# Patient Record
Sex: FEMALE | Race: WHITE | NOT HISPANIC OR LATINO | URBAN - METROPOLITAN AREA
[De-identification: names, ages, dates, MRNs, and addresses within clinical notes are randomized per-mention and may not be internally consistent; named-entity substitution may affect disease eponyms.]

---

## 2023-06-14 LAB
ANION GAP IN SER/PLAS: 14 MMOL/L (ref 10–20)
CALCIUM (MG/DL) IN SER/PLAS: 9.8 MG/DL (ref 8.6–10.6)
CARBON DIOXIDE, TOTAL (MMOL/L) IN SER/PLAS: 25 MMOL/L (ref 21–32)
CHLORIDE (MMOL/L) IN SER/PLAS: 104 MMOL/L (ref 98–107)
CREATININE (MG/DL) IN SER/PLAS: 0.58 MG/DL (ref 0.5–1.05)
GFR FEMALE: >90 ML/MIN/1.73M2
GLUCOSE (MG/DL) IN SER/PLAS: 98 MG/DL (ref 74–99)
PHOSPHATE (MG/DL) IN SER/PLAS: 4.2 MG/DL (ref 2.5–4.9)
POC CALCIUM IONIZED (MMOL/L) IN BLOOD: 1.22 MMOL/L (ref 1.1–1.33)
POTASSIUM (MMOL/L) IN SER/PLAS: 4.1 MMOL/L (ref 3.5–5.3)
SODIUM (MMOL/L) IN SER/PLAS: 139 MMOL/L (ref 136–145)
UREA NITROGEN (MG/DL) IN SER/PLAS: 19 MG/DL (ref 6–23)

## 2023-06-15 LAB
ANION GAP IN SER/PLAS: NORMAL
CALCIUM (MG/DL) IN SER/PLAS: NORMAL
CARBON DIOXIDE, TOTAL (MMOL/L) IN SER/PLAS: NORMAL
CHLORIDE (MMOL/L) IN SER/PLAS: NORMAL
CREATININE (MG/DL) IN SER/PLAS: NORMAL
GFR FEMALE: NORMAL
GFR MALE: NORMAL
GLUCOSE (MG/DL) IN SER/PLAS: NORMAL
PHOSPHATE (MG/DL) IN SER/PLAS: NORMAL
POC CALCIUM IONIZED (MMOL/L) IN BLOOD: NORMAL
POTASSIUM (MMOL/L) IN SER/PLAS: NORMAL
SODIUM (MMOL/L) IN SER/PLAS: NORMAL
UREA NITROGEN (MG/DL) IN SER/PLAS: NORMAL

## 2023-12-14 ENCOUNTER — TELEMEDICINE (OUTPATIENT)
Dept: ENDOCRINOLOGY | Facility: CLINIC | Age: 63
End: 2023-12-14
Payer: COMMERCIAL

## 2023-12-14 DIAGNOSIS — E06.3 HYPOTHYROIDISM DUE TO HASHIMOTO'S THYROIDITIS: Primary | ICD-10-CM

## 2023-12-14 DIAGNOSIS — E03.8 HYPOTHYROIDISM DUE TO HASHIMOTO'S THYROIDITIS: Primary | ICD-10-CM

## 2023-12-14 DIAGNOSIS — E83.51 HYPOCALCEMIA: ICD-10-CM

## 2023-12-14 DIAGNOSIS — E89.2 POSTSURGICAL HYPOPARATHYROIDISM (MULTI): ICD-10-CM

## 2023-12-14 DIAGNOSIS — M32.9 LUPUS (MULTI): ICD-10-CM

## 2023-12-14 DIAGNOSIS — G90.A POTS (POSTURAL ORTHOSTATIC TACHYCARDIA SYNDROME): ICD-10-CM

## 2023-12-14 PROCEDURE — 99214 OFFICE O/P EST MOD 30 MIN: CPT | Performed by: INTERNAL MEDICINE

## 2023-12-14 RX ORDER — LIOTHYRONINE SODIUM 5 UG/1
7.5 TABLET ORAL DAILY
Qty: 90 TABLET | Refills: 3 | Status: SHIPPED | OUTPATIENT
Start: 2023-12-14

## 2023-12-14 RX ORDER — LIOTHYRONINE SODIUM 5 UG/1
7.5 TABLET ORAL DAILY
COMMUNITY
End: 2023-12-14 | Stop reason: SDUPTHER

## 2023-12-14 RX ORDER — CALCITRIOL 0.5 UG/1
1 CAPSULE ORAL EVERY 12 HOURS
COMMUNITY
Start: 2013-01-15 | End: 2023-12-14 | Stop reason: SDUPTHER

## 2023-12-14 RX ORDER — MOMETASONE FUROATE 50 UG/1
2 SPRAY, METERED NASAL DAILY
COMMUNITY
Start: 2023-02-06 | End: 2023-12-14

## 2023-12-14 RX ORDER — AMLODIPINE BESYLATE 5 MG/1
5 TABLET ORAL DAILY
COMMUNITY

## 2023-12-14 RX ORDER — CALCITRIOL 0.5 UG/1
0.5 CAPSULE ORAL EVERY 12 HOURS
Qty: 30 CAPSULE | Refills: 11 | Status: SHIPPED | OUTPATIENT
Start: 2023-12-14

## 2023-12-14 RX ORDER — CALCIUM CARB/VITAMIN D3/VIT K1 500-100-40
TABLET,CHEWABLE ORAL
COMMUNITY
Start: 2023-08-21 | End: 2024-01-22 | Stop reason: SDUPTHER

## 2023-12-14 RX ORDER — BECLOMETHASONE DIPROPIONATE HFA 40 UG/1
120 AEROSOL, METERED RESPIRATORY (INHALATION)
COMMUNITY

## 2023-12-14 RX ORDER — TERIPARATIDE 250 UG/ML
INJECTION, SOLUTION SUBCUTANEOUS
COMMUNITY
End: 2024-01-09 | Stop reason: SDUPTHER

## 2023-12-14 RX ORDER — LEVOTHYROXINE SODIUM 100 UG/1
100 TABLET ORAL
COMMUNITY
End: 2023-12-14 | Stop reason: SDUPTHER

## 2023-12-14 RX ORDER — LEVOTHYROXINE SODIUM 100 UG/1
100 TABLET ORAL
Qty: 90 TABLET | Refills: 3 | Status: SHIPPED | OUTPATIENT
Start: 2023-12-14

## 2023-12-14 NOTE — PROGRESS NOTES
Consults    Reason For Consult  Hypocalcemia     History Of Present Illness  Thomas López is a 63 y.o. female presenting with hypocalcemia .     Having autoimmune flares up  She is needed prednisone   CBD immune modulator  Diet   When stops forteo , she ha slow calcium  When takes calcium then she is high        cytomel 1.5 tb every other day , and 1 tb rest of the week   Synthorid 100 mcg six days a week     she had joint pain  wt is normal currently       currently on forteo 0.5 units daily - monthly   for her hypocalcemia and bone health    very complicated, hypocalcemia due to parathyroidectomy, and despite having PTH implant, requiring calcium monitoring weekly or biweekly .    also hypothyroidism since age 23, Hashimoto's     stopped Cytomel started neck, hand pains  Synthroid 100 mcg ,takes five days a week     she has also fatty liver , sees GI , familial as well     hba1c 6.3   due to steroid    she had ovarian hyperplasia, has parathyroid hyperplasia , adrenal hyperplasia       prior this time she did not have autotransplant  she had double vision       previous note:  osteoporosis Y -3.1  on collagen and vit C supplement  2011 and 2022 bone density is stable   on forteo     Synthroid 6 days a week  Cytomel 7 days a week on hold due to diarrhea       some of her free ionized calcium are high on and off from [facility], latest one are stable   PTH reviewed     she has fatty liver     0.75 twice a day forteo, if eats diary she goes down on forteo.    previous:    She feels perhaps her thyroid is off again  She has been followed for her Covid symptoms she is going to see again her physicians as she has been very tired  Since she had Covid she is feeling not that well    She noticed again not feeling well when we did a blood test she was hypercalcemic again she is using Forteo for her osteoporosis as well as control her hyperparathyroidism, she has been fluctuating up and down    Brief history    I was seeing her at  the [facility] for a complicated medical history  She had hyperparathyroidism and had surgery after which she developed complex difficult to control hypocalcemia  She has osteoporosis  Spine disease  She has hypothyroidism  she continues to have fluctuation of her blood sugars causing hypoglycemia we did at the [facility] fasting test and a mixed meal test and she was more diagnostic for hyperinsulinemic hypoglycemia due to insulin resistance state  Her osteoporosis and hypercalcemia responded to a very small dose of Forteo daily this was started by her rheumatologist at the [facility] when he retired I took over and we continued small dose of Forteo daily this is not only for her osteoporosis treatment but also for her severe hypocalcemia with out the Forteo versus replacement even on Rocaltrol and calcium replacement she is not able to maintain normocalcemia and requires ED visits and hospitalization      reviewed labs :    BAS METAB 2000 PNL SERPL    Collected: 10/03/2022 11:13 AM    Status: F    Source: [facility] REPOSITORY    Order Comment: Specimen Type: BLOOD SPECIMEN Ordering Facility: [facility] Address: [address] [place] [address]     her history include important for management   Hypocalcemia after surgery for hyper parathyroidism her hypocalcemia is from hypoparathyroidism with out Forteo she did develop fluctuation once more especially it was elevated and alk phos was elevated which is unusual in her case she usually goes to the emergency room's for hypercalcemia not hypercalcemia at this time we are going to repeat the levels as per the standing order we will monitor adjust her medication as needed parathyroid hormone levels from both arms were checked to make sure the implant was working     So Forteo has been saving her life without going to the emergency room's as well as treating her osteoporosis  We will do a bone density again  after COVID-19 as she is high risk for complicated COVID-19 infection  Hypothyroidism her outside labs from the [facility] reviewed she has good levels of thyroid hormone her last PTH calcium magnesium were done August 2020  She is up-to-date with her refills  She has had reactive hypoglycemia/insulin resistance with hypoglycemia , they are more stable    she is treating it with diet and monitoring              Active Problems  Problems    · Acquired plantar porokeratosis (701.1) (L85.1)   · Acute cystitis (595.0) (N30.00)   · Adrenal congenital hyperplasia (255.2) (E25.0)   · Asthma (493.90) (J45.909)   · Bilateral shoulder pain (719.41) (M25.511,M25.512)   · Carpal tunnel syndrome (354.0) (G56.00)   · Cervical radiculopathy, chronic (723.4) (M54.12)   · Cough (786.2) (R05.9)   · Fatigue (780.79) (R53.83)   · Feeling weak (780.79) (R53.1)   · Foot abscess, right (682.7) (L02.611)   · GERD (gastroesophageal reflux disease) (530.81) (K21.9)   · Hypercalciuria (275.40) (R82.994)   · Hyperinsulinemic hypoglycemia (251.1) (E16.1)   · Hyperlipidemia (272.4) (E78.5)   · Hypocalcemia (275.41) (E83.51)   · Hypothyroidism (244.9) (E03.9)   · Influenza (487.1) (J11.1)   · Limb pain (729.5) (M79.609)   · Lupus (710.0) (M32.9)   · Myalgia and myositis (729.1)   · Osteoporosis, post-menopausal (733.01) (M81.0)   · Pain in both wrists (719.43) (M25.531,M25.532)   · Physical exam, routine (V70.0) (Z00.00)   · Postsurgical hypoparathyroidism (252.1) (E89.2)   · Postsurgical hypoparathyroidism (252.1) (E89.2)   · Pre-ulcerative corn or callous (700) (L84)   · Pronation of both feet (736.79) (M21.6X1,M21.6X2)   · Raynauds disease (443.0) (I73.00)   · Right foot pain (729.5) (M79.671)   · Right wrist pain (719.43) (M25.531)   · S/P biopsy (V45.89) (Z98.890)   · Shortness of breath (786.05) (R06.02)   · Shoulder impingement, left (719.81) (M25.812)   · Shoulder impingement, right (719.81) (M25.811)   · Sjogrens syndrome (710.2)  (M35.00)    Past Medical History  Problems    · History of hypertension (V12.59) (Z86.79)   · History of polycystic ovarian syndrome (V13.29) (Z87.42)   · History of rheumatic fever (V12.09) (Z86.79)   · History of shortness of breath (V13.89) (Z87.898)   · Hyperinsulinemic hypoglycemia (251.1) (E16.1)   · History of Hyponatremia (276.1) (E87.1)   · Physical exam, routine (V70.0) (Z00.00)   · Sjogrens syndrome (710.2) (M35.00)    Surgical History  Problems    · History of Eye Surgery   · History of Laminectomy Lumbar   · History of Parathyroid Complete Parathyroidectomy   · History of Total Abdominal Hysterectomy With Removal Of Both Ovaries    Family History  Mother    · Family history of goiter (V18.19) (Z83.49)   · Family history of hypertension (V17.49) (Z82.49)   · Family history of hypothyroidism (V18.19) (Z83.49)   · Family history of osteoporosis (V17.81) (Z82.62)   · Family history of Reported Family History Of A Goiter   · Family history of Type 1 Diabetes Mellitus   · Family history of Type I Multiple Endocrine Neoplasia (V18.11)  Father    · Family history of hypertension (V17.49) (Z82.49)   · Family history of kidney stones (V18.69) (Z84.1)   · Family history of Type 2 Diabetes Mellitus  Brother    · Family history of hypertension (V17.49) (Z82.49)  Grandparent    · Family history of hypertension (V17.49) (Z82.49)  Grandmother    · Family history of cardiac disorder (V17.49) (Z82.49)  Grandfather    · Family history of skin cancer (V16.8) (Z80.8)  Aunt    · Family history of skin cancer (V16.8) (Z80.8)    Social History  Problems    · Current every day smoker (305.1) (F17.200)   · Daily caffeine consumption   · Never Drank Alcohol   · Retired    Allergies  Medication    · Abilify TABS  Seizures; mental status change; Recorded By: Hoda Padilla; 10/16/2013 4:10:29 PM   · Aspirin TABS  Shortness of breath; Rash; Recorded By: Hoda Padilla; 10/16/2013 4:10:29 PM   · Cephalosporins  Allergy; Hypotension;;  Recorded By: Hannah Velazco; 10/16/2013 3:23:19 PM   · Rocaltrol CAPS  Shortness of breath; muscle pain; Nausea; Recorded By: Hoda Padilla; 10/16/2013   4:10:30 PM   · Wellbutrin SR TBCR  Seizures;; Recorded By: Hoda Padilla; 10/16/2013 4:10:29 PM   · Advair Diskus MISC  chest pain; Recorded By: Hoda Padilla; 10/16/2013 4:10:30 PM   · Ciprofloxacin HCl TABS  Allergy; tendonitis; Recorded By: Hannah Velazco; 10/15/2013 2:54:06 PM   · Codeine Derivatives  Rash; Vomiting; Recorded By: Hoda Padilla; 10/16/2013 4:10:29 PM   · Corticosteroids  gi upset,anxiety; Recorded By: Hoda Padilla; 10/16/2013 4:10:30 PM   · Cortisone Acetate TABS  hyper, mental status change; Recorded By: Hoda Padilla; 10/16/2013 4:10:29 PM   · Erythromycin Base TABS  Allergy; Vomiting; Recorded By: Hannah Velazco; 10/16/2013 3:23:19 PM  Can take Biaxin and Zithromax per patient   · Flexeril TABS  intolerance muscle weakness; Recorded By: Hoda Padilla; 10/16/2013 4:10:30 PM   · Influenza (Split PF)  falling, muscle weakness; Syncope; Recorded By: Hoda Padilla; 10/16/2013 4:10:30 PM   · Lactose  gi upset; Recorded By: Hoda Padilal; 10/16/2013 4:10:30 PM   · Levaquin TABS  Allergy; shoulder, muscle pain, weakness; Recorded By: Hannah Velazco; 10/16/2013   3:23:19 PM   · Morphine Derivatives  Rash; Vomiting; Recorded By: Hoda Padilla; 10/16/2013 4:10:29 PM   · NSAIDs  gi upset; Recorded By: Hoda Padilla; 10/16/2013 4:10:29 PM   · oxyCODONE HCl CAPS  Rash; Vomiting; Recorded By: Hoda Padilla; 10/16/2013 4:10:29 PM   · Plaquenil TABS  weak; Recorded By: Hoda Padilla; 10/16/2013 4:10:30 PM   · Singulair TABS  Rash; Recorded By: Hoda Padilla; 10/16/2013 4:10:30 PM   · Statins  Myalgia; Recorded By: Hoda Padilla; 10/16/2013 4:10:30 PM   · Sulfa Drugs  Allergy; Rash; Recorded By: Hannah Velazco; 10/16/2013 3:23:19 PM   · Symbicort AERO  chest pain; Recorded By: Hoda Padilla; 10/16/2013 4:10:30 PM   · Vancomycin HCl CAPS  Adverse Reaction;  redness of whold body; Recorded By: Hannah Velazco; 10/16/2013   3:23:19 PM   · Acetaminophen CAPS  Recorded By: Sarah Zamora; 9/23/2014 1:28:30 PM   · bacitracin  Recorded By: Sarah Zamora; 9/23/2014 1:28:30 PM   · Clarithromycin TABS  Recorded By: Sarah Zamora; 9/23/2014 1:28:30 PM   · Erythromycin TABS  Recorded By: Sarah Zamora; 9/23/2014 1:28:30 PM   · fentanyl  Recorded By: Sarah Zamora; 9/23/2014 1:28:30 PM   · hydroCHLOROthiazide TABS  Recorded By: Sarah Zamora; 9/23/2014 1:28:30 PM   · levofloxacin  Recorded By: Sarah Zamora; 9/23/2014 1:28:30 PM   · methylcellulose  Recorded By: Sarah Zamora; 9/23/2014 1:28:30 PM   · morphine  Recorded By: Sarah Zamora; 9/23/2014 1:28:30 PM   · Rocaltrol  Recorded By: Sarah Zamora; 9/23/2014 1:28:30 PM   · Vancomycin Cross Reactors  Recorded By: Sarah Zamora; 9/23/2014 1:28:30 PM  NonMedication    · Chocolate  gi upset; Recorded By: Hoda Padilla; 10/16/2013 4:10:30 PM   · IV Contrast Dye  Vomiting; Recorded By: Hoda Padilla; 10/16/2013 4:10:30 PM   · Gluten  Recorded By: Sarah Zamora; 9/23/2014 1:28:30 PM   · Latex  Recorded By: Sarah Zamora; 9/23/2014 1:28:30 PM   · Onions  Recorded By: Sarah Zamora; 9/23/2014 1:28:30 PM   · Peppers  Recorded By: Sarah Zamora; 9/23/2014 1:28:30 PM  Any family history of thyroid cancer? no  Any personal history of radiation to your head/neck? no    Past Medical History  She has a past medical history of Encounter for general adult medical examination without abnormal findings (05/29/2013), Hypo-osmolality and hyponatremia, Other hypoglycemia (10/27/2021), Personal history of other diseases of the circulatory system, Personal history of other diseases of the circulatory system, Personal history of other diseases of the female genital tract, Personal history of other specified conditions, and Sjogren syndrome, unspecified (CMS/HCC) (12/01/2021).    Surgical History  She has a past surgical history that  includes Total abdominal hysterectomy w/ bilateral salpingoophorectomy (10/15/2013); Other surgical history (10/15/2013); Eye surgery (09/23/2014); and Lumbar laminectomy (09/23/2014).     Social History  She has no history on file for tobacco use, alcohol use, and drug use.    Family History  No family history on file.     Allergies  Patient has no allergy information on record.    Review of Systems    Past Medical History:   Diagnosis Date    Encounter for general adult medical examination without abnormal findings 05/29/2013    Physical exam, routine    Hypo-osmolality and hyponatremia     Hyponatremia    Other hypoglycemia 10/27/2021    Hyperinsulinemic hypoglycemia    Personal history of other diseases of the circulatory system     History of rheumatic fever    Personal history of other diseases of the circulatory system     History of hypertension    Personal history of other diseases of the female genital tract     History of polycystic ovarian syndrome    Personal history of other specified conditions     History of shortness of breath    Sjogren syndrome, unspecified (CMS/[facility]) 12/01/2021    Sjogrens syndrome       Past Surgical History:   Procedure Laterality Date    EYE SURGERY  09/23/2014    Eye Surgery    LUMBAR LAMINECTOMY  09/23/2014    Laminectomy Lumbar    OTHER SURGICAL HISTORY  10/15/2013    Parathyroid Complete Parathyroidectomy    TOTAL ABDOMINAL HYSTERECTOMY W/ BILATERAL SALPINGOOPHORECTOMY  10/15/2013    Total Abdominal Hysterectomy With Removal Of Both Ovaries       Social History     Socioeconomic History    Marital status: Single     Spouse name: Not on file    Number of children: Not on file    Years of education: Not on file    Highest education level: Not on file   Occupational History    Not on file   Tobacco Use    Smoking status: Not on file    Smokeless tobacco: Not on file   Substance and Sexual Activity    Alcohol use: Not on file    Drug use: Not on file    Sexual activity: Not on  "file   Other Topics Concern    Not on file   Social History Narrative    Not on file     Social Determinants of Health     Financial Resource Strain: Not on file   Food Insecurity: Not on file   Transportation Needs: Not on file   Physical Activity: Not on file   Stress: Not on file   Social Connections: Not on file   Intimate Partner Violence: Not on file   Housing Stability: Not on file        Physical Exam     ROS, PMH, FH/SH, surgical history and allergies have been reviewed.    Last Recorded Vitals  There were no vitals taken for this visit.    Relevant Results         If you would like to pull in Medications, type .meds     If you would like to pull in Lab results for the last 24 hours, type .[id number]    If you would like to pull in specific Lab results, type .ll     If you would like to pull in Imaging results, type .imgrslt :99}  Lab Review  Lab Results   Component Value Date    BILITOT 0.2 10/11/2022    CALCIUM 9.8 06/14/2023    CO2 25 06/14/2023     06/14/2023    CREATININE 0.58 06/14/2023    GLUCOSE 98 06/14/2023    ALKPHOS 126 (H) 10/11/2022    K 4.1 06/14/2023    PROT 7.8 10/11/2022     06/14/2023    AST 18 10/11/2022    ALT 17 10/11/2022    BUN 19 06/14/2023    ANIONGAP 14 06/14/2023    MG 2.0 10/11/2022    PHOS 4.2 06/14/2023    ALBUMIN 4.6 10/11/2022    LIPASE 17 01/22/2018    GFRF >90 06/14/2023    GFRMALE CANCELED 06/14/2023     No results found for: \"TRIG\", \"CHOL\", \"LDLCALC\", \"HDL\"  Lab Results   Component Value Date    HGBA1C 6.3 (H) 06/03/2022     The ASCVD Risk score (Amilcar QUINTERO, et al., 2019) failed to calculate for the following reasons:    The systolic blood pressure is missing    Cannot find a previous HDL lab    Cannot find a previous total cholesterol lab    The smoking status is invalid       Assessment/Plan   Problem List Items Addressed This Visit             ICD-10-CM    Lupus (CMS/HCC) M32.9    Postsurgical hypoparathyroidism (CMS/[facility]) E89.2     Other Visit Diagnoses  "        Codes    Hypothyroidism due to Hashimoto's thyroiditis    -  Primary E03.8, E06.3    Relevant Medications    calcitriol (Rocaltrol) 0.5 mcg capsule    Other Relevant Orders    CBC    Comprehensive Metabolic Panel    Vitamin B12    Vitamin D 25-Hydroxy,Total (for eval of Vitamin D levels)    Vitamin B6    Vitamin B2, Plasma    Vitamin A    Magnesium    Parathyroid Hormone, Intact    Calcium, Ionized    Thyroxine, Free    Thyroid Stimulating Hormone    Triiodothyronine, Free    POTS (postural orthostatic tachycardia syndrome)     G90.A    Relevant Medications    calcitriol (Rocaltrol) 0.5 mcg capsule    Other Relevant Orders    CBC    Comprehensive Metabolic Panel    Vitamin B12    Vitamin D 25-Hydroxy,Total (for eval of Vitamin D levels)    Vitamin B6    Vitamin B2, Plasma    Vitamin A    Magnesium    Parathyroid Hormone, Intact    Calcium, Ionized    Thyroxine, Free    Thyroid Stimulating Hormone    Triiodothyronine, Free    Hypocalcemia     E83.51    Relevant Medications    calcitriol (Rocaltrol) 0.5 mcg capsule    Other Relevant Orders    CBC    Comprehensive Metabolic Panel    Vitamin B12    Vitamin D 25-Hydroxy,Total (for eval of Vitamin D levels)    Vitamin B6    Vitamin B2, Plasma    Vitamin A    Magnesium    Parathyroid Hormone, Intact    Calcium, Ionized    Thyroxine, Free    Thyroid Stimulating Hormone    Triiodothyronine, Free                Assessed    · Hypercalciuria (275.40) (R82.994)   · Postsurgical hypoparathyroidism (252.1) (E89.2)   · Hypocalcemia (275.41) (E83.51)   · Hypothyroidism (244.9) (E03.9)   · Adrenal congenital hyperplasia (255.2) (E25.0)    hypothyroidism continue Synthroid, now on 71 mcg daily resumed Cytomel , and she is taking it 1.5 tb a day every other day   we stopped it originally due to diarrhea, and racing heart       hypocalcemia due to hypoparathyroidism after surgical removal   we are trying to increase oral calcium and decrease forteo, on small dose currently   not  on calcium, and any dairy as she goes up   on small doses of forteo  wants to try calcitriol- did not work , gave her blurry vision and headaches       adrenal hyperplasia  bone density is stable at this time   has been now hypercalcemia on and off, continue monitoring   if eats dairy she needs to perhaps skip a dose  calcitriol emergency if needed    she has Sjogren     she has joint pain cervical bulging disc      She has standing order for calcium  she is going to continue to do the as needed    I spent a total of 30+ minutes on the date of the service which included preparing to see the patient, face-to-face patient care, completing clinical documentation, obtaining and / or reviewing separately obtained history, counseling and educating the patient/family/caregiver, ordering medications, tests, or procedures, communicating with other healthcare providers (not separately reported), independently interpreting results, not separately reported, and communicating results to the patient/family/caregiver, real-time telemedicine visit using audiovisual technology with patient's verbal consent.  Name and date of birth verified.      Kimberly Aguilar MD

## 2023-12-14 NOTE — TELEPHONE ENCOUNTER
Thomas López  Contact office to schedule 4 month follow up , request labs to be mailed and refills of her cytomel and levothyroxine . Orderes pended to provider , labs to be printed and mailed to patient. Lynda Bradshaw LPN

## 2023-12-15 ENCOUNTER — TELEPHONE (OUTPATIENT)
Dept: ENDOCRINOLOGY | Facility: CLINIC | Age: 63
End: 2023-12-15
Payer: COMMERCIAL

## 2023-12-15 DIAGNOSIS — E89.2 POST-SURGICAL HYPOPARATHYROIDISM (MULTI): ICD-10-CM

## 2023-12-15 NOTE — TELEPHONE ENCOUNTER
Patient called and is asking for a standing BMP to have for the future to use with her Calcium Iodized lab after her initial CMP lab that is coming up . Order pended to provider. Lynda Bradshaw LPN

## 2024-01-08 ENCOUNTER — PATIENT MESSAGE (OUTPATIENT)
Dept: ENDOCRINOLOGY | Facility: CLINIC | Age: 64
End: 2024-01-08
Payer: COMMERCIAL

## 2024-01-09 DIAGNOSIS — M81.0 OSTEOPOROSIS, UNSPECIFIED OSTEOPOROSIS TYPE, UNSPECIFIED PATHOLOGICAL FRACTURE PRESENCE: ICD-10-CM

## 2024-01-09 DIAGNOSIS — E83.51 HYPOCALCEMIA: ICD-10-CM

## 2024-01-09 RX ORDER — CALCITRIOL 0.25 UG/1
1 CAPSULE ORAL DAILY
COMMUNITY
End: 2024-01-09 | Stop reason: SDUPTHER

## 2024-01-09 RX ORDER — CALCITRIOL 0.25 UG/1
1 CAPSULE ORAL DAILY
Qty: 90 CAPSULE | Refills: 3 | Status: SHIPPED | OUTPATIENT
Start: 2024-01-09

## 2024-01-09 RX ORDER — TERIPARATIDE 250 UG/ML
INJECTION, SOLUTION SUBCUTANEOUS
Qty: 3 EACH | Refills: 11 | Status: SHIPPED | OUTPATIENT
Start: 2024-01-09

## 2024-01-09 NOTE — TELEPHONE ENCOUNTER
Patient requesting refill of Forteo , BMP lab order and new script for 025 mcg capsules of calcitriol instead of .5 mcg capsules as Pharmacy is having difficulty getting the 05 mcg . Orders pended to provider . Lynda Bradshaw LPN

## 2024-01-22 DIAGNOSIS — E20.9 HYPOPARATHYROIDISM, UNSPECIFIED HYPOPARATHYROIDISM TYPE (MULTI): ICD-10-CM

## 2024-01-22 RX ORDER — CALCIUM CARB/VITAMIN D3/VIT K1 500-100-40
TABLET,CHEWABLE ORAL
Qty: 100 EACH | Refills: 11 | Status: SHIPPED | OUTPATIENT
Start: 2024-01-22

## 2024-01-22 NOTE — TELEPHONE ENCOUNTER
Patient requesting refill of 31 g syringe for use with forteo . Script pended to provider. Lynda Bradshaw LPN

## 2024-02-16 ENCOUNTER — APPOINTMENT (OUTPATIENT)
Dept: PRIMARY CARE | Facility: CLINIC | Age: 64
End: 2024-02-16
Payer: COMMERCIAL

## 2024-03-25 PROBLEM — M35.00 SJOGRENS SYNDROME (MULTI): Status: ACTIVE | Noted: 2024-03-25

## 2024-03-25 PROBLEM — R00.2 PALPITATIONS: Status: ACTIVE | Noted: 2021-11-16

## 2024-03-25 PROBLEM — M21.6X1 PRONATION OF BOTH FEET: Status: ACTIVE | Noted: 2024-03-25

## 2024-03-25 PROBLEM — E16.1 HYPERINSULINEMIA: Status: ACTIVE | Noted: 2021-02-09

## 2024-03-25 PROBLEM — I10 HYPERTENSION: Status: ACTIVE | Noted: 2024-03-25

## 2024-03-25 PROBLEM — M79.609 LIMB PAIN: Status: RESOLVED | Noted: 2024-03-25 | Resolved: 2024-03-25

## 2024-03-25 PROBLEM — G56.00 CARPAL TUNNEL SYNDROME: Status: ACTIVE | Noted: 2024-03-25

## 2024-03-25 PROBLEM — J11.1 INFLUENZA: Status: RESOLVED | Noted: 2024-03-25 | Resolved: 2024-03-25

## 2024-03-25 PROBLEM — E03.9 HYPOTHYROIDISM: Status: ACTIVE | Noted: 2024-03-25

## 2024-03-25 PROBLEM — M35.01 KERATOCONJUNCTIVITIS SICCA, IN SJOGREN'S SYNDROME (MULTI): Status: ACTIVE | Noted: 2021-02-09

## 2024-03-25 PROBLEM — E16.1 HYPERINSULINEMIC HYPOGLYCEMIA: Status: ACTIVE | Noted: 2024-03-25

## 2024-03-25 PROBLEM — R05.3 CHRONIC COUGH: Status: ACTIVE | Noted: 2023-02-18

## 2024-03-25 PROBLEM — F41.9 ANXIETY: Status: ACTIVE | Noted: 2024-03-25

## 2024-03-25 PROBLEM — M99.01 CERVICAL SEGMENT DYSFUNCTION: Status: ACTIVE | Noted: 2022-04-26

## 2024-03-25 PROBLEM — J45.909 ASTHMA (HHS-HCC): Status: ACTIVE | Noted: 2024-03-25

## 2024-03-25 PROBLEM — M99.02 THORACIC SEGMENT DYSFUNCTION: Status: ACTIVE | Noted: 2022-04-26

## 2024-03-25 PROBLEM — E03.8 HYPOTHYROIDISM DUE TO HASHIMOTO'S THYROIDITIS: Status: ACTIVE | Noted: 2017-02-17

## 2024-03-25 PROBLEM — M25.511 BILATERAL SHOULDER PAIN: Status: RESOLVED | Noted: 2024-03-25 | Resolved: 2024-03-25

## 2024-03-25 PROBLEM — H25.10 NUCLEAR SCLEROSIS: Status: ACTIVE | Noted: 2024-03-25

## 2024-03-25 PROBLEM — M79.671 RIGHT FOOT PAIN: Status: RESOLVED | Noted: 2024-03-25 | Resolved: 2024-03-25

## 2024-03-25 PROBLEM — M25.532 PAIN IN BOTH WRISTS: Status: RESOLVED | Noted: 2024-03-25 | Resolved: 2024-03-25

## 2024-03-25 PROBLEM — R05.9 COUGH: Status: RESOLVED | Noted: 2024-03-25 | Resolved: 2024-03-25

## 2024-03-25 PROBLEM — L82.1 OTHER SEBORRHEIC KERATOSIS: Status: ACTIVE | Noted: 2019-09-11

## 2024-03-25 PROBLEM — E78.5 HYPERLIPIDEMIA: Status: ACTIVE | Noted: 2024-03-25

## 2024-03-25 PROBLEM — N30.00 ACUTE CYSTITIS: Status: RESOLVED | Noted: 2024-03-25 | Resolved: 2024-03-25

## 2024-03-25 PROBLEM — L84 PRE-ULCERATIVE CORN OR CALLOUS: Status: ACTIVE | Noted: 2024-03-25

## 2024-03-25 PROBLEM — D49.2 NEOPLASM OF UNSPECIFIED BEHAVIOR OF BONE, SOFT TISSUE, AND SKIN: Status: ACTIVE | Noted: 2019-09-11

## 2024-03-25 PROBLEM — M99.03 SOMATIC DYSFUNCTION OF LUMBAR REGION: Status: ACTIVE | Noted: 2022-04-26

## 2024-03-25 PROBLEM — M25.811 SHOULDER IMPINGEMENT, RIGHT: Status: ACTIVE | Noted: 2024-03-25

## 2024-03-25 PROBLEM — R06.02 SHORTNESS OF BREATH: Status: ACTIVE | Noted: 2024-03-25

## 2024-03-25 PROBLEM — M99.08 RIB CAGE REGION SOMATIC DYSFUNCTION: Status: ACTIVE | Noted: 2022-04-26

## 2024-03-25 PROBLEM — E06.3 HYPOTHYROIDISM DUE TO HASHIMOTO'S THYROIDITIS: Status: ACTIVE | Noted: 2017-02-17

## 2024-03-25 PROBLEM — I73.00 RAYNAUDS DISEASE: Status: ACTIVE | Noted: 2024-03-25

## 2024-03-25 PROBLEM — K21.9 GERD (GASTROESOPHAGEAL REFLUX DISEASE): Status: ACTIVE | Noted: 2024-03-25

## 2024-03-25 PROBLEM — G47.00 INSOMNIA: Status: ACTIVE | Noted: 2024-03-25

## 2024-03-25 PROBLEM — Z98.890 S/P BIOPSY: Status: RESOLVED | Noted: 2024-03-25 | Resolved: 2024-03-25

## 2024-03-25 PROBLEM — L02.611 FOOT ABSCESS, RIGHT: Status: RESOLVED | Noted: 2024-03-25 | Resolved: 2024-03-25

## 2024-03-25 PROBLEM — M81.0 OSTEOPOROSIS, POST-MENOPAUSAL: Status: ACTIVE | Noted: 2024-03-25

## 2024-03-25 PROBLEM — R53.1 FEELING WEAK: Status: RESOLVED | Noted: 2024-03-25 | Resolved: 2024-03-25

## 2024-03-25 PROBLEM — M25.531 PAIN IN BOTH WRISTS: Status: RESOLVED | Noted: 2024-03-25 | Resolved: 2024-03-25

## 2024-03-25 PROBLEM — M25.512 BILATERAL SHOULDER PAIN: Status: RESOLVED | Noted: 2024-03-25 | Resolved: 2024-03-25

## 2024-03-25 PROBLEM — F43.10 POSTTRAUMATIC STRESS DISORDER: Status: ACTIVE | Noted: 2024-03-25

## 2024-03-25 PROBLEM — R53.83 FATIGUE: Status: RESOLVED | Noted: 2024-03-25 | Resolved: 2024-03-25

## 2024-03-25 PROBLEM — R82.994 HYPERCALCIURIA: Status: ACTIVE | Noted: 2024-03-25

## 2024-03-25 PROBLEM — M21.6X2 PRONATION OF BOTH FEET: Status: ACTIVE | Noted: 2024-03-25

## 2024-03-25 PROBLEM — M79.10 MYALGIA: Status: ACTIVE | Noted: 2024-03-25

## 2024-03-25 PROBLEM — D22.5 MELANOCYTIC NEVI OF TRUNK: Status: ACTIVE | Noted: 2019-09-11

## 2024-03-25 PROBLEM — R11.2 NAUSEA & VOMITING: Status: RESOLVED | Noted: 2024-03-25 | Resolved: 2024-03-25

## 2024-03-25 RX ORDER — NIZATIDINE 150 MG/1
CAPSULE ORAL EVERY 8 HOURS
COMMUNITY

## 2024-03-25 RX ORDER — LEVALBUTEROL TARTRATE 45 UG/1
AEROSOL, METERED ORAL EVERY 8 HOURS
COMMUNITY

## 2024-03-25 RX ORDER — ALBUTEROL SULFATE 90 UG/1
AEROSOL, METERED RESPIRATORY (INHALATION)
COMMUNITY

## 2024-03-25 RX ORDER — MAGNESIUM 250 MG
TABLET ORAL
COMMUNITY
Start: 2022-05-20

## 2024-03-25 RX ORDER — MOMETASONE FUROATE 50 UG/1
SPRAY, METERED NASAL EVERY 12 HOURS
COMMUNITY

## 2024-03-25 RX ORDER — LOTEPREDNOL ETABONATE 5 MG/ML
SUSPENSION/ DROPS OPHTHALMIC
COMMUNITY

## 2024-03-25 RX ORDER — LORAZEPAM 1 MG/1
1 TABLET ORAL 2 TIMES DAILY
COMMUNITY

## 2024-03-25 RX ORDER — FLUTICASONE PROPIONATE 50 MCG
1 SPRAY, SUSPENSION (ML) NASAL 2 TIMES DAILY
COMMUNITY
Start: 2023-04-03

## 2024-03-25 RX ORDER — VITAMIN K2 40 MCG
100 TABLET ORAL
COMMUNITY
Start: 2018-04-03

## 2024-03-25 RX ORDER — LEVALBUTEROL INHALATION SOLUTION 0.63 MG/3ML
SOLUTION RESPIRATORY (INHALATION) EVERY 8 HOURS
COMMUNITY

## 2024-03-25 RX ORDER — CALCIUM CITRATE/VITAMIN D3 315MG-6.25
TABLET ORAL 4 TIMES DAILY
COMMUNITY

## 2024-03-25 RX ORDER — CALCIUM CARBONATE 200(500)MG
TABLET,CHEWABLE ORAL EVERY 6 HOURS
COMMUNITY

## 2024-03-25 RX ORDER — HYDROCORTISONE 25 MG/G
CREAM TOPICAL
COMMUNITY
Start: 2023-04-12

## 2024-03-25 RX ORDER — ACETAMINOPHEN 500 MG
3000 TABLET ORAL 3 TIMES DAILY
COMMUNITY

## 2024-03-25 RX ORDER — CRANBERRY FRUIT EXTRACT 250 MG
CAPSULE ORAL EVERY 24 HOURS
COMMUNITY

## 2024-03-25 RX ORDER — AZELASTINE 1 MG/ML
1 SPRAY, METERED NASAL 2 TIMES DAILY
COMMUNITY

## 2024-03-25 RX ORDER — ONDANSETRON 4 MG/1
TABLET, FILM COATED ORAL
COMMUNITY

## 2024-04-03 ENCOUNTER — LAB (OUTPATIENT)
Dept: LAB | Facility: LAB | Age: 64
End: 2024-04-03
Payer: COMMERCIAL

## 2024-04-03 DIAGNOSIS — E03.8 HYPOTHYROIDISM DUE TO HASHIMOTO'S THYROIDITIS: ICD-10-CM

## 2024-04-03 DIAGNOSIS — E06.3 HYPOTHYROIDISM DUE TO HASHIMOTO'S THYROIDITIS: ICD-10-CM

## 2024-04-03 DIAGNOSIS — F41.1 GENERALIZED ANXIETY DISORDER: ICD-10-CM

## 2024-04-03 DIAGNOSIS — Z79.899 OTHER LONG TERM (CURRENT) DRUG THERAPY: Primary | ICD-10-CM

## 2024-04-03 DIAGNOSIS — R69 DIAGNOSIS UNKNOWN: Primary | ICD-10-CM

## 2024-04-03 DIAGNOSIS — E89.2 POST-SURGICAL HYPOPARATHYROIDISM (MULTI): ICD-10-CM

## 2024-04-03 DIAGNOSIS — G90.A POTS (POSTURAL ORTHOSTATIC TACHYCARDIA SYNDROME): ICD-10-CM

## 2024-04-03 DIAGNOSIS — E83.51 HYPOCALCEMIA: ICD-10-CM

## 2024-04-03 DIAGNOSIS — F43.12 POST-TRAUMATIC STRESS DISORDER, CHRONIC: ICD-10-CM

## 2024-04-03 LAB
25(OH)D3 SERPL-MCNC: 72 NG/ML (ref 30–100)
ALBUMIN SERPL BCP-MCNC: 4.8 G/DL (ref 3.4–5)
ALP SERPL-CCNC: 109 U/L (ref 33–136)
ALT SERPL W P-5'-P-CCNC: 20 U/L (ref 7–45)
AMPHETAMINES UR QL SCN>1000 NG/ML: NEGATIVE
ANION GAP SERPL CALC-SCNC: 14 MMOL/L (ref 10–20)
AST SERPL W P-5'-P-CCNC: 17 U/L (ref 9–39)
BARBITURATES UR QL SCN>300 NG/ML: NEGATIVE
BENZODIAZ UR QL SCN>300 NG/ML: NEGATIVE
BILIRUB SERPL-MCNC: 0.4 MG/DL (ref 0–1.2)
BUN SERPL-MCNC: 13 MG/DL (ref 6–23)
BZE UR QL SCN>300 NG/ML: NEGATIVE
CA-I BLD-SCNC: 1.19 MMOL/L (ref 1.1–1.33)
CALCIUM SERPL-MCNC: 9.8 MG/DL (ref 8.6–10.6)
CANNABINOIDS UR QL SCN>50 NG/ML: POSITIVE
CHLORIDE SERPL-SCNC: 101 MMOL/L (ref 98–107)
CO2 SERPL-SCNC: 28 MMOL/L (ref 21–32)
CREAT SERPL-MCNC: 0.62 MG/DL (ref 0.5–1.05)
EGFRCR SERPLBLD CKD-EPI 2021: >90 ML/MIN/1.73M*2
ERYTHROCYTE [DISTWIDTH] IN BLOOD BY AUTOMATED COUNT: 14.7 % (ref 11.5–14.5)
FENTANYL+NORFENTANYL UR QL SCN: NEGATIVE
GLUCOSE SERPL-MCNC: 113 MG/DL (ref 74–99)
HCT VFR BLD AUTO: 48.4 % (ref 36–46)
HGB BLD-MCNC: 15.2 G/DL (ref 12–16)
MAGNESIUM SERPL-MCNC: 2.04 MG/DL (ref 1.6–2.4)
MCH RBC QN AUTO: 27 PG (ref 26–34)
MCHC RBC AUTO-ENTMCNC: 31.4 G/DL (ref 32–36)
MCV RBC AUTO: 86 FL (ref 80–100)
METHADONE UR QL SCN>300 NG/ML: NEGATIVE
NRBC BLD-RTO: 0 /100 WBCS (ref 0–0)
OPIATES UR QL SCN>300 NG/ML: NEGATIVE
OXYCODONE UR QL: NEGATIVE
PCP UR QL SCN>25 NG/ML: NEGATIVE
PLATELET # BLD AUTO: 357 X10*3/UL (ref 150–450)
POTASSIUM SERPL-SCNC: 3.8 MMOL/L (ref 3.5–5.3)
PROT SERPL-MCNC: 8 G/DL (ref 6.4–8.2)
PTH-INTACT SERPL-MCNC: 15.5 PG/ML (ref 18.5–88)
RBC # BLD AUTO: 5.63 X10*6/UL (ref 4–5.2)
SODIUM SERPL-SCNC: 139 MMOL/L (ref 136–145)
T3FREE SERPL-MCNC: 3.7 PG/ML (ref 2.3–4.2)
T4 FREE SERPL-MCNC: 1.22 NG/DL (ref 0.78–1.48)
TSH SERPL-ACNC: 0.76 MIU/L (ref 0.44–3.98)
VIT B12 SERPL-MCNC: 677 PG/ML (ref 211–911)
WBC # BLD AUTO: 10.8 X10*3/UL (ref 4.4–11.3)

## 2024-04-03 PROCEDURE — 84481 FREE ASSAY (FT-3): CPT

## 2024-04-03 PROCEDURE — 82607 VITAMIN B-12: CPT

## 2024-04-03 PROCEDURE — 80307 DRUG TEST PRSMV CHEM ANLYZR: CPT

## 2024-04-03 PROCEDURE — 84439 ASSAY OF FREE THYROXINE: CPT

## 2024-04-03 PROCEDURE — 82306 VITAMIN D 25 HYDROXY: CPT

## 2024-04-03 PROCEDURE — 84252 ASSAY OF VITAMIN B-2: CPT

## 2024-04-03 PROCEDURE — 84590 ASSAY OF VITAMIN A: CPT

## 2024-04-03 PROCEDURE — 84207 ASSAY OF VITAMIN B-6: CPT

## 2024-04-03 PROCEDURE — 83970 ASSAY OF PARATHORMONE: CPT

## 2024-04-03 PROCEDURE — 82330 ASSAY OF CALCIUM: CPT

## 2024-04-03 PROCEDURE — 83735 ASSAY OF MAGNESIUM: CPT

## 2024-04-03 PROCEDURE — 85027 COMPLETE CBC AUTOMATED: CPT

## 2024-04-03 PROCEDURE — 80053 COMPREHEN METABOLIC PANEL: CPT

## 2024-04-03 PROCEDURE — 36415 COLL VENOUS BLD VENIPUNCTURE: CPT

## 2024-04-03 PROCEDURE — 84443 ASSAY THYROID STIM HORMONE: CPT

## 2024-04-04 ENCOUNTER — APPOINTMENT (OUTPATIENT)
Dept: PRIMARY CARE | Facility: CLINIC | Age: 64
End: 2024-04-04
Payer: COMMERCIAL

## 2024-04-06 LAB
ANNOTATION COMMENT IMP: NORMAL
PYRIDOXAL PHOS SERPL-SCNC: 38.8 NMOL/L (ref 20–125)
RETINYL PALMITATE SERPL-MCNC: 0.04 MG/L (ref 0–0.1)
VIT A SERPL-MCNC: 0.56 MG/L (ref 0.3–1.2)
VIT B2 SERPL-SCNC: 8 NMOL/L (ref 5–50)

## 2024-04-16 ENCOUNTER — TELEMEDICINE (OUTPATIENT)
Dept: ENDOCRINOLOGY | Facility: CLINIC | Age: 64
End: 2024-04-16
Payer: COMMERCIAL

## 2024-04-16 DIAGNOSIS — E89.2 POSTSURGICAL HYPOPARATHYROIDISM (MULTI): Primary | ICD-10-CM

## 2024-04-16 PROCEDURE — 99214 OFFICE O/P EST MOD 30 MIN: CPT | Performed by: INTERNAL MEDICINE

## 2024-04-16 PROCEDURE — 1159F MED LIST DOCD IN RCRD: CPT | Performed by: INTERNAL MEDICINE

## 2024-04-16 NOTE — PROGRESS NOTES
Consults    Reason For Consult  Hypocalcemia       History Of Present Illness  Thomas López is a 64 y.o. female presenting with hypocalcemia .    Levothyroxine 0.1  6 days a week  Cytomel 7. 5 mcg 2 days then 5 , take 5 mcg    Having autoimmune flares up  CBD immune modulator    When stops forteo , she has low calcium   low sex drive after covid infection.  Reviewed testosterone and DHEAS      cytomel 1.5 tb every other day , and 1 tb rest of the week   Synthorid 100 mcg six days a week    forteo 0.5 units twice a day         she had joint pain  wt is normal currently         currently on forteo 0.5 units daily - monthly   for her hypocalcemia and bone health     very complicated, hypocalcemia due to parathyroidectomy, and despite having PTH implant, requiring calcium monitoring weekly or biweekly .     also hypothyroidism since age 23, Hashimoto's      stopped Cytomel started neck, hand pains  Synthroid 100 mcg ,takes five days a week      she has also fatty liver , sees GI , familial as well      hba1c 6.3   due to steroid     she had ovarian hyperplasia, has parathyroid hyperplasia , adrenal hyperplasia         prior this time she did not have autotransplant  she had double vision         previous note:  osteoporosis Y -3.1  on collagen and vit C supplement  2011 and 2022 bone density is stable   on forteo      Synthroid 6 days a week  Cytomel 7 days a week on hold due to diarrhea         some of her free ionized calcium are high on and off from [facility], latest one are stable   PTH reviewed      she has fatty liver      0.75 twice a day forteo, if eats diary she goes down on forteo.     previous:     She feels perhaps her thyroid is off again  She has been followed for her Covid symptoms she is going to see again her physicians as she has been very tired  Since she had Covid she is feeling not that well     She noticed again not feeling well when we did a blood test she was hypercalcemic again she is using Forteo  for her osteoporosis as well as control her hyperparathyroidism, she has been fluctuating up and down     Brief history     I was seeing her at the [facility] for a complicated medical history  She had hyperparathyroidism and had surgery after which she developed complex difficult to control hypocalcemia  She has osteoporosis  Spine disease  She has hypothyroidism  she continues to have fluctuation of her blood sugars causing hypoglycemia we did at the [facility] fasting test and a mixed meal test and she was more diagnostic for hyperinsulinemic hypoglycemia due to insulin resistance state  Her osteoporosis and hypercalcemia responded to a very small dose of Forteo daily this was started by her rheumatologist at the [facility] when he retired I took over and we continued small dose of Forteo daily this is not only for her osteoporosis treatment but also for her severe hypocalcemia with out the Forteo versus replacement even on Rocaltrol and calcium replacement she is not able to maintain normocalcemia and requires ED visits and hospitalization      her history include important for management   Hypocalcemia after surgery for hyper parathyroidism her hypocalcemia is from hypoparathyroidism with out Forteo she did develop fluctuation once more especially it was elevated and alk phos was elevated which is unusual in her case she usually goes to the emergency room's for hypercalcemia not hypercalcemia at this time we are going to repeat the levels as per the standing order we will monitor adjust her medication as needed parathyroid hormone levels from both arms were checked to make sure the implant was working      So Forteo has been saving her life without going to the emergency room's as well as treating her osteoporosis  We will do a bone density again after COVID-19 as she is high risk for complicated COVID-19 infection  Hypothyroidism her outside labs from the [facility] reviewed she has good levels  of thyroid hormone her last PTH calcium magnesium were done August 2020  She is up-to-date with her refills  She has had reactive hypoglycemia/insulin resistance with hypoglycemia , they are more stable    she is treating it with diet and monitoring        Any family history of thyroid cancer? no  Any personal history of radiation to your head/neck? no    Past Medical History  She has a past medical history of Acute cystitis (03/25/2024), Bilateral shoulder pain (03/25/2024), Cough (03/25/2024), Encounter for general adult medical examination without abnormal findings (05/29/2013), Feeling weak (03/25/2024), Foot abscess, right (03/25/2024), Hashimoto's thyroiditis, Hyperparathyroidism (Multi), Hypertension, Hypo-osmolality and hyponatremia, Hypothyroidism, Influenza (03/25/2024), Limb pain (03/25/2024), Other hypoglycemia (10/27/2021), Personal history of other diseases of the circulatory system, Personal history of other diseases of the circulatory system, Personal history of other diseases of the female genital tract, Personal history of other specified conditions, Polycystic ovary syndrome, and Sjogren syndrome, unspecified (Multi) (12/01/2021).    Surgical History  She has a past surgical history that includes Total abdominal hysterectomy w/ bilateral salpingoophorectomy (10/15/2013); Other surgical history (10/15/2013); Eye surgery (09/23/2014); Lumbar laminectomy (09/23/2014); Parathyroid gland surgery; and Woodruff tooth extraction.     Social History  She reports that she has been smoking cigarettes. She has a 7.5 pack-year smoking history. She has never used smokeless tobacco. She reports that she does not currently use alcohol. She reports that she does not currently use drugs after having used the following drugs: Marijuana.    Family History  Family History   Problem Relation Name Age of Onset    Diabetes Mother      Stroke Mother      Other (end stage renal) Mother      Diabetes Father      Coronary  artery disease Father          Allergies  Aripiprazole, Aspirin, Bupropion hcl, Calcitriol, Cephalosporins, Fluticasone propion-salmeterol, Hydrochlorothiazide, Influenza virus vaccines, Nsaids (non-steroidal anti-inflammatory drug), Peanut, Tramadol, Tuna oil, Cortisone, Fluticasone propionate, Hydroxychloroquine, Iodine, Methylcellulose, Metoprolol, Prochlorperazine, Promethazine, Statins-hmg-coa reductase inhibitors, Vancomycin, Acetaminophen, Azithromycin, Budesonide, Budesonide-formoterol, Bupropion, Ciprofloxacin, Clarithromycin, Cocoa, Corticosteroids (glucocorticoids), Doxycycline, Erythromycin base, Fentanyl, Gluten, Levalbuterol, Levofloxacin, Liothyronine, Ondansetron hcl, Prednisone, Sennosides, Bacitracin, Codeine, Cyclobenzaprine, Fosfomycin, Iodinated contrast media, Lactose, Latex, Meloxicam, Montelukast, Montelukast sodium, Morphine, Oxycodone, and Sulfa (sulfonamide antibiotics)    Review of Systems    Past Medical History:   Diagnosis Date    Acute cystitis 03/25/2024    Bilateral shoulder pain 03/25/2024    Cough 03/25/2024    Encounter for general adult medical examination without abnormal findings 05/29/2013    Physical exam, routine    Feeling weak 03/25/2024    Foot abscess, right 03/25/2024    Hashimoto's thyroiditis     Hyperparathyroidism (Multi)     Hypertension     Hypo-osmolality and hyponatremia     Hyponatremia    Hypothyroidism     Influenza 03/25/2024    Limb pain 03/25/2024    Other hypoglycemia 10/27/2021    Hyperinsulinemic hypoglycemia    Personal history of other diseases of the circulatory system     History of rheumatic fever    Personal history of other diseases of the circulatory system     History of hypertension    Personal history of other diseases of the female genital tract     History of polycystic ovarian syndrome    Personal history of other specified conditions     History of shortness of breath    Polycystic ovary syndrome     Sjogren syndrome, unspecified (Multi)  12/01/2021    Sjogrens syndrome       Past Surgical History:   Procedure Laterality Date    EYE SURGERY  09/23/2014    Eye Surgery    LUMBAR LAMINECTOMY  09/23/2014    Laminectomy Lumbar    OTHER SURGICAL HISTORY  10/15/2013    Parathyroid Complete Parathyroidectomy    PARATHYROID GLAND SURGERY      TOTAL ABDOMINAL HYSTERECTOMY W/ BILATERAL SALPINGOOPHORECTOMY  10/15/2013    Total Abdominal Hysterectomy With Removal Of Both Ovaries    WISDOM TOOTH EXTRACTION         Social History     Socioeconomic History    Marital status: Single     Spouse name: Not on file    Number of children: Not on file    Years of education: Not on file    Highest education level: Not on file   Occupational History    Not on file   Tobacco Use    Smoking status: Every Day     Current packs/day: 0.50     Average packs/day: 0.5 packs/day for 15.0 years (7.5 ttl pk-yrs)     Types: Cigarettes    Smokeless tobacco: Never   Substance and Sexual Activity    Alcohol use: Not Currently    Drug use: Not Currently     Types: Marijuana    Sexual activity: Not Currently     Partners: Male   Other Topics Concern    Not on file   Social History Narrative    Not on file     Social Determinants of Health     Financial Resource Strain: Unknown (12/2/2022)    Received from [facility]    Overall Financial Resource Strain (CARDIA)     Difficulty of Paying Living Expenses: Patient declined   Food Insecurity: Unknown (12/2/2022)    Received from [facility]    Hunger Vital Sign     Worried About Running Out of Food in the Last Year: Patient declined     Ran Out of Food in the Last Year: Patient declined   Transportation Needs: Unknown (12/2/2022)    Received from [facility]    PRAPARE - Transportation     Lack of Transportation (Medical): Patient declined     Lack of Transportation (Non-Medical): Patient declined   Physical Activity: Unknown (12/2/2022)    Received from [facility]    Exercise Vital Sign     Days of Exercise per Week:  Patient declined     Minutes of Exercise per Session: Patient declined   Stress: Unknown (12/2/2022)    Received from [facility]    [employer] [place] [employer] - Occupational Stress Questionnaire     Feeling of Stress : Patient declined   Social Connections: Unknown (12/2/2022)    Received from [facility]    Social Connection and Isolation Panel [NHANES]     Frequency of Communication with Friends and Family: Patient declined     Frequency of Social Gatherings with Friends and Family: Patient declined     Attends Sabianism Services: Patient declined     Active Member of Clubs or Organizations: Patient declined     Attends Club or Organization Meetings: Patient declined     Marital Status: Patient declined   Intimate Partner Violence: Not on file   Housing Stability: Unknown (12/2/2022)    Received from [facility]    Housing Stability Vital Sign     Unable to Pay for Housing in the Last Year: Patient refused     Number of Places Lived in the Last Year: Not on file     Unstable Housing in the Last Year: Patient refused        Physical Exam     ROS, PMH, FH/SH, surgical history and allergies have been reviewed.    Last Recorded Vitals  There were no vitals taken for this visit.    Relevant Results         If you would like to pull in Medications, type .meds     If you would like to pull in Lab results for the last 24 hours, type .[id number]    If you would like to pull in specific Lab results, type .ll     If you would like to pull in Imaging results, type .[id number] :99}  Lab Review  Lab Results   Component Value Date    BILITOT 0.4 04/03/2024    CALCIUM 9.8 04/03/2024    CO2 28 04/03/2024     04/03/2024    CREATININE 0.62 04/03/2024    GLUCOSE 113 (H) 04/03/2024    ALKPHOS 109 04/03/2024    K 3.8 04/03/2024    PROT 8.0 04/03/2024     04/03/2024    AST 17 04/03/2024    ALT 20 04/03/2024    BUN 13 04/03/2024    ANIONGAP 14 04/03/2024    MG 2.04 04/03/2024    PHOS 4.2 06/14/2023  "   ALBUMIN 4.8 04/03/2024    LIPASE 17 01/22/2018    GFRF >90 06/14/2023    GFRMALE CANCELED 06/14/2023     No results found for: \"TRIG\", \"CHOL\", \"LDLCALC\", \"HDL\"  Lab Results   Component Value Date    HGBA1C 6.3 (H) 06/03/2022     The ASCVD Risk score (Amilcar QUINTERO, et al., 2019) failed to calculate for the following reasons:    The systolic blood pressure is missing    Cannot find a previous HDL lab    Cannot find a previous total cholesterol lab       Assessment/Plan   Problem List Items Addressed This Visit             ICD-10-CM    Postsurgical hypoparathyroidism (Multi) - Primary E89.2    Relevant Orders    DHEA-Sulfate    Testosterone,Free and Total    Cortisol    Adrenocorticotropic Hormone (ACTH)    FSH & LH            · Postsurgical hypoparathyroidism (252.1) (E89.2)   · Hypocalcemia (275.41) (E83.51)   · Hypothyroidism (244.9) (E03.9)     hypothyroidism continue Synthroid, Cytomel , and she is taking it 1.5 tb a day every other day      hypocalcemia due to hypoparathyroidism after surgical removal   we are trying to increase oral calcium and decrease forteo, on small dose currently   not on calcium, and any dairy as she goes up   on small doses of forteo  wants to try calcitriol- did not work , gave her blurry vision and headaches         adrenal hyperplasia  bone density is stable at this time   has been now hypercalcemia on and off, continue monitoring   if eats dairy she needs to perhaps skip a dose  calcitriol emergency if needed     she has Sjogren      she has joint pain cervical bulging disc        I spent a total of 30+ minutes on the date of the service which included preparing to see the patient, face-to-face patient care, completing clinical documentation, obtaining and / or reviewing separately obtained history, counseling and educating the patient/family/caregiver, ordering medications, tests, or procedures, communicating with other healthcare providers (not separately reported), independently " interpreting results, not separately reported, and communicating results to the patient/family/caregiver, real-time telemedicine visit using audiovisual technology with patient's verbal consent.  Name and date of birth verified.      Kimberly Aguilar MD

## 2024-05-08 ENCOUNTER — APPOINTMENT (OUTPATIENT)
Dept: PRIMARY CARE | Facility: CLINIC | Age: 64
End: 2024-05-08
Payer: COMMERCIAL

## 2024-07-09 ENCOUNTER — APPOINTMENT (OUTPATIENT)
Dept: PRIMARY CARE | Facility: CLINIC | Age: 64
End: 2024-07-09
Payer: COMMERCIAL

## 2024-08-12 ENCOUNTER — LAB (OUTPATIENT)
Dept: LAB | Facility: LAB | Age: 64
End: 2024-08-12
Payer: COMMERCIAL

## 2024-08-12 DIAGNOSIS — G90.A POTS (POSTURAL ORTHOSTATIC TACHYCARDIA SYNDROME): ICD-10-CM

## 2024-08-12 DIAGNOSIS — E83.51 HYPOCALCEMIA: ICD-10-CM

## 2024-08-12 DIAGNOSIS — E89.2 POSTSURGICAL HYPOPARATHYROIDISM (MULTI): ICD-10-CM

## 2024-08-12 DIAGNOSIS — E03.8 HYPOTHYROIDISM DUE TO HASHIMOTO'S THYROIDITIS: ICD-10-CM

## 2024-08-12 DIAGNOSIS — E06.3 HYPOTHYROIDISM DUE TO HASHIMOTO'S THYROIDITIS: ICD-10-CM

## 2024-08-12 DIAGNOSIS — E89.2 POST-SURGICAL HYPOPARATHYROIDISM (MULTI): ICD-10-CM

## 2024-08-12 LAB
ANION GAP SERPL CALC-SCNC: 13 MMOL/L
BUN SERPL-MCNC: 15 MG/DL (ref 8–25)
CA-I BLD-SCNC: 1.12 MMOL/L (ref 1.1–1.33)
CALCIUM SERPL-MCNC: 9.2 MG/DL (ref 8.5–10.4)
CHLORIDE SERPL-SCNC: 103 MMOL/L (ref 97–107)
CO2 SERPL-SCNC: 25 MMOL/L (ref 24–31)
CREAT SERPL-MCNC: 0.6 MG/DL (ref 0.4–1.6)
DHEA-S SERPL-MCNC: 43 UG/DL (ref 13–130)
EGFRCR SERPLBLD CKD-EPI 2021: >90 ML/MIN/1.73M*2
FSH SERPL-ACNC: 70.5 IU/L
GLUCOSE SERPL-MCNC: 101 MG/DL (ref 65–99)
LH SERPL-ACNC: 22.1 IU/L
POTASSIUM SERPL-SCNC: 3.6 MMOL/L (ref 3.4–5.1)
SODIUM SERPL-SCNC: 141 MMOL/L (ref 133–145)
T3FREE SERPL-MCNC: 3.7 PG/ML (ref 2.3–4.2)
T4 FREE SERPL-MCNC: 1.4 NG/DL (ref 0.9–1.7)
TSH SERPL DL<=0.05 MIU/L-ACNC: 0.05 MIU/L (ref 0.27–4.2)

## 2024-08-12 PROCEDURE — 82024 ASSAY OF ACTH: CPT

## 2024-08-12 PROCEDURE — 82627 DEHYDROEPIANDROSTERONE: CPT

## 2024-08-12 PROCEDURE — 82330 ASSAY OF CALCIUM: CPT

## 2024-08-12 PROCEDURE — 83001 ASSAY OF GONADOTROPIN (FSH): CPT

## 2024-08-12 PROCEDURE — 83002 ASSAY OF GONADOTROPIN (LH): CPT

## 2024-08-12 PROCEDURE — 80048 BASIC METABOLIC PNL TOTAL CA: CPT

## 2024-08-12 PROCEDURE — 84443 ASSAY THYROID STIM HORMONE: CPT

## 2024-08-12 PROCEDURE — 84402 ASSAY OF FREE TESTOSTERONE: CPT

## 2024-08-12 PROCEDURE — 84439 ASSAY OF FREE THYROXINE: CPT

## 2024-08-12 PROCEDURE — 84481 FREE ASSAY (FT-3): CPT

## 2024-08-12 PROCEDURE — 36415 COLL VENOUS BLD VENIPUNCTURE: CPT

## 2024-08-14 ENCOUNTER — OFFICE VISIT (OUTPATIENT)
Dept: PRIMARY CARE | Facility: CLINIC | Age: 64
End: 2024-08-14
Payer: COMMERCIAL

## 2024-08-14 VITALS
BODY MASS INDEX: 20.6 KG/M2 | OXYGEN SATURATION: 98 % | TEMPERATURE: 96.6 F | DIASTOLIC BLOOD PRESSURE: 80 MMHG | WEIGHT: 109 LBS | SYSTOLIC BLOOD PRESSURE: 120 MMHG | HEART RATE: 83 BPM

## 2024-08-14 DIAGNOSIS — J30.9 ALLERGIC RHINITIS, UNSPECIFIED SEASONALITY, UNSPECIFIED TRIGGER: ICD-10-CM

## 2024-08-14 DIAGNOSIS — Z28.21 PNEUMOCOCCAL VACCINE REFUSED: ICD-10-CM

## 2024-08-14 DIAGNOSIS — G72.0 STATIN MYOPATHY: ICD-10-CM

## 2024-08-14 DIAGNOSIS — M81.0 OSTEOPOROSIS, POST-MENOPAUSAL: ICD-10-CM

## 2024-08-14 DIAGNOSIS — J45.40 MODERATE PERSISTENT ASTHMA WITHOUT COMPLICATION (HHS-HCC): ICD-10-CM

## 2024-08-14 DIAGNOSIS — Z53.20 OSTEOPOROSIS SCREENING DECLINED: ICD-10-CM

## 2024-08-14 DIAGNOSIS — H04.123 CHRONIC DRYNESS OF BOTH EYES: ICD-10-CM

## 2024-08-14 DIAGNOSIS — E89.2 POSTSURGICAL HYPOPARATHYROIDISM (MULTI): ICD-10-CM

## 2024-08-14 DIAGNOSIS — Z53.20 SCREENING MAMMOGRAPHY DECLINED: ICD-10-CM

## 2024-08-14 DIAGNOSIS — T46.6X5A STATIN MYOPATHY: ICD-10-CM

## 2024-08-14 DIAGNOSIS — Z00.00 INITIAL MEDICARE ANNUAL WELLNESS VISIT: Primary | ICD-10-CM

## 2024-08-14 DIAGNOSIS — F17.200 TOBACCO USE DISORDER: ICD-10-CM

## 2024-08-14 DIAGNOSIS — F41.9 ANXIETY: ICD-10-CM

## 2024-08-14 DIAGNOSIS — E78.2 MIXED HYPERLIPIDEMIA: ICD-10-CM

## 2024-08-14 DIAGNOSIS — I10 PRIMARY HYPERTENSION: ICD-10-CM

## 2024-08-14 PROBLEM — E16.1 HYPERINSULINEMIA: Status: RESOLVED | Noted: 2021-02-09 | Resolved: 2024-08-14

## 2024-08-14 PROBLEM — M99.03 SOMATIC DYSFUNCTION OF LUMBAR REGION: Status: RESOLVED | Noted: 2022-04-26 | Resolved: 2024-08-14

## 2024-08-14 PROBLEM — R05.3 CHRONIC COUGH: Status: RESOLVED | Noted: 2023-02-18 | Resolved: 2024-08-14

## 2024-08-14 PROBLEM — D49.2 NEOPLASM OF UNSPECIFIED BEHAVIOR OF BONE, SOFT TISSUE, AND SKIN: Status: RESOLVED | Noted: 2019-09-11 | Resolved: 2024-08-14

## 2024-08-14 PROBLEM — K21.9 GERD (GASTROESOPHAGEAL REFLUX DISEASE): Status: RESOLVED | Noted: 2024-03-25 | Resolved: 2024-08-14

## 2024-08-14 PROBLEM — E03.8 HYPOTHYROIDISM DUE TO HASHIMOTO'S THYROIDITIS: Status: RESOLVED | Noted: 2017-02-17 | Resolved: 2024-08-14

## 2024-08-14 PROBLEM — M79.10 MYALGIA: Status: RESOLVED | Noted: 2024-03-25 | Resolved: 2024-08-14

## 2024-08-14 PROBLEM — M35.01 KERATOCONJUNCTIVITIS SICCA, IN SJOGREN'S SYNDROME (MULTI): Status: RESOLVED | Noted: 2021-02-09 | Resolved: 2024-08-14

## 2024-08-14 PROBLEM — M99.02 THORACIC SEGMENT DYSFUNCTION: Status: RESOLVED | Noted: 2022-04-26 | Resolved: 2024-08-14

## 2024-08-14 PROBLEM — L84 PRE-ULCERATIVE CORN OR CALLOUS: Status: RESOLVED | Noted: 2024-03-25 | Resolved: 2024-08-14

## 2024-08-14 PROBLEM — M21.6X2 PRONATION OF BOTH FEET: Status: RESOLVED | Noted: 2024-03-25 | Resolved: 2024-08-14

## 2024-08-14 PROBLEM — I73.00 RAYNAUDS DISEASE: Status: RESOLVED | Noted: 2024-03-25 | Resolved: 2024-08-14

## 2024-08-14 PROBLEM — R06.02 SHORTNESS OF BREATH: Status: RESOLVED | Noted: 2024-03-25 | Resolved: 2024-08-14

## 2024-08-14 PROBLEM — M21.6X1 PRONATION OF BOTH FEET: Status: RESOLVED | Noted: 2024-03-25 | Resolved: 2024-08-14

## 2024-08-14 PROBLEM — R00.2 PALPITATIONS: Status: RESOLVED | Noted: 2021-11-16 | Resolved: 2024-08-14

## 2024-08-14 PROBLEM — E11.9 TYPE 2 DIABETES MELLITUS WITHOUT COMPLICATION, WITH LONG-TERM CURRENT USE OF INSULIN (MULTI): Status: ACTIVE | Noted: 2024-08-14

## 2024-08-14 PROBLEM — G56.00 CARPAL TUNNEL SYNDROME: Status: RESOLVED | Noted: 2024-03-25 | Resolved: 2024-08-14

## 2024-08-14 PROBLEM — Z79.4 TYPE 2 DIABETES MELLITUS WITHOUT COMPLICATION, WITH LONG-TERM CURRENT USE OF INSULIN (MULTI): Status: ACTIVE | Noted: 2024-08-14

## 2024-08-14 PROBLEM — R82.994 HYPERCALCIURIA: Status: RESOLVED | Noted: 2024-03-25 | Resolved: 2024-08-14

## 2024-08-14 PROBLEM — J45.909 ASTHMA (HHS-HCC): Status: RESOLVED | Noted: 2024-03-25 | Resolved: 2024-08-14

## 2024-08-14 PROBLEM — M25.811 SHOULDER IMPINGEMENT, RIGHT: Status: RESOLVED | Noted: 2024-03-25 | Resolved: 2024-08-14

## 2024-08-14 PROBLEM — E16.1 HYPERINSULINEMIC HYPOGLYCEMIA: Status: RESOLVED | Noted: 2024-03-25 | Resolved: 2024-08-14

## 2024-08-14 PROBLEM — Z79.4 TYPE 2 DIABETES MELLITUS WITHOUT COMPLICATION, WITH LONG-TERM CURRENT USE OF INSULIN (MULTI): Status: RESOLVED | Noted: 2024-08-14 | Resolved: 2024-08-14

## 2024-08-14 PROBLEM — G47.00 INSOMNIA: Status: RESOLVED | Noted: 2024-03-25 | Resolved: 2024-08-14

## 2024-08-14 PROBLEM — E11.9 TYPE 2 DIABETES MELLITUS WITHOUT COMPLICATION, WITH LONG-TERM CURRENT USE OF INSULIN (MULTI): Status: RESOLVED | Noted: 2024-08-14 | Resolved: 2024-08-14

## 2024-08-14 PROBLEM — E06.3 HYPOTHYROIDISM DUE TO HASHIMOTO'S THYROIDITIS: Status: RESOLVED | Noted: 2017-02-17 | Resolved: 2024-08-14

## 2024-08-14 PROBLEM — L82.1 OTHER SEBORRHEIC KERATOSIS: Status: RESOLVED | Noted: 2019-09-11 | Resolved: 2024-08-14

## 2024-08-14 LAB — ACTH PLAS-MCNC: 5.3 PG/ML (ref 7.2–63.3)

## 2024-08-14 PROCEDURE — 3079F DIAST BP 80-89 MM HG: CPT | Performed by: NURSE PRACTITIONER

## 2024-08-14 PROCEDURE — 1123F ACP DISCUSS/DSCN MKR DOCD: CPT | Performed by: NURSE PRACTITIONER

## 2024-08-14 PROCEDURE — 1158F ADVNC CARE PLAN TLK DOCD: CPT | Performed by: NURSE PRACTITIONER

## 2024-08-14 PROCEDURE — G0439 PPPS, SUBSEQ VISIT: HCPCS | Performed by: NURSE PRACTITIONER

## 2024-08-14 PROCEDURE — 1160F RVW MEDS BY RX/DR IN RCRD: CPT | Performed by: NURSE PRACTITIONER

## 2024-08-14 PROCEDURE — 99215 OFFICE O/P EST HI 40 MIN: CPT | Performed by: NURSE PRACTITIONER

## 2024-08-14 PROCEDURE — 3074F SYST BP LT 130 MM HG: CPT | Performed by: NURSE PRACTITIONER

## 2024-08-14 PROCEDURE — 1126F AMNT PAIN NOTED NONE PRSNT: CPT | Performed by: NURSE PRACTITIONER

## 2024-08-14 PROCEDURE — 1159F MED LIST DOCD IN RCRD: CPT | Performed by: NURSE PRACTITIONER

## 2024-08-14 RX ORDER — AMLODIPINE BESYLATE 5 MG/1
5 TABLET ORAL DAILY
Qty: 90 TABLET | Refills: 3 | Status: SHIPPED | OUTPATIENT
Start: 2024-08-14

## 2024-08-14 RX ORDER — LOTEPREDNOL ETABONATE 5 MG/ML
1 SUSPENSION/ DROPS OPHTHALMIC 4 TIMES DAILY
Qty: 5 ML | Refills: 0 | Status: SHIPPED | OUTPATIENT
Start: 2024-08-14

## 2024-08-14 ASSESSMENT — PATIENT HEALTH QUESTIONNAIRE - PHQ9
2. FEELING DOWN, DEPRESSED OR HOPELESS: NOT AT ALL
SUM OF ALL RESPONSES TO PHQ9 QUESTIONS 1 AND 2: 0
1. LITTLE INTEREST OR PLEASURE IN DOING THINGS: NOT AT ALL

## 2024-08-14 ASSESSMENT — LIFESTYLE VARIABLES
HOW OFTEN DURING THE LAST YEAR HAVE YOU BEEN UNABLE TO REMEMBER WHAT HAPPENED THE NIGHT BEFORE BECAUSE YOU HAD BEEN DRINKING: NEVER
HOW OFTEN DURING THE LAST YEAR HAVE YOU FAILED TO DO WHAT WAS NORMALLY EXPECTED FROM YOU BECAUSE OF DRINKING: NEVER
HOW MANY STANDARD DRINKS CONTAINING ALCOHOL DO YOU HAVE ON A TYPICAL DAY: PATIENT DOES NOT DRINK
HOW OFTEN DURING THE LAST YEAR HAVE YOU FOUND THAT YOU WERE NOT ABLE TO STOP DRINKING ONCE YOU HAD STARTED: NEVER
HAVE YOU OR SOMEONE ELSE BEEN INJURED AS A RESULT OF YOUR DRINKING: NO
HOW OFTEN DO YOU HAVE SIX OR MORE DRINKS ON ONE OCCASION: NEVER
AUDIT-C TOTAL SCORE: 0
HOW OFTEN DURING THE LAST YEAR HAVE YOU HAD A FEELING OF GUILT OR REMORSE AFTER DRINKING: NEVER
HAS A RELATIVE, FRIEND, DOCTOR, OR ANOTHER HEALTH PROFESSIONAL EXPRESSED CONCERN ABOUT YOUR DRINKING OR SUGGESTED YOU CUT DOWN: NO
SKIP TO QUESTIONS 9-10: 1
HOW OFTEN DURING THE LAST YEAR HAVE YOU NEEDED AN ALCOHOLIC DRINK FIRST THING IN THE MORNING TO GET YOURSELF GOING AFTER A NIGHT OF HEAVY DRINKING: NEVER
HOW OFTEN DO YOU HAVE A DRINK CONTAINING ALCOHOL: NEVER
AUDIT TOTAL SCORE: 0

## 2024-08-14 ASSESSMENT — ENCOUNTER SYMPTOMS
HEADACHES: 0
SHORTNESS OF BREATH: 0
AGITATION: 0
WOUND: 0
FATIGUE: 0
CHEST TIGHTNESS: 0
CONFUSION: 0
DEPRESSION: 0
CHILLS: 0
HEMATURIA: 0
FACIAL ASYMMETRY: 0
SPEECH DIFFICULTY: 0
LOSS OF SENSATION IN FEET: 0
BRUISES/BLEEDS EASILY: 0
COUGH: 0
SEIZURES: 0
DIAPHORESIS: 0
PALPITATIONS: 0
VOMITING: 0
NAUSEA: 0
POLYDIPSIA: 0
FLANK PAIN: 0
ADENOPATHY: 0
POLYPHAGIA: 0
DYSURIA: 0
BLOOD IN STOOL: 0
ABDOMINAL PAIN: 0
DIZZINESS: 0
FEVER: 0
BACK PAIN: 0
OCCASIONAL FEELINGS OF UNSTEADINESS: 0
NECK PAIN: 0

## 2024-08-14 ASSESSMENT — PAIN SCALES - GENERAL: PAINLEVEL: 0-NO PAIN

## 2024-08-14 NOTE — PROGRESS NOTES
[facility]: [facility]  MEDICARE WELLNESS EXAM      Thomas López is a 64 y.o. female that is presenting today to establish with PCP and Initial Medicare Wellness Exam.    Ms. López reports she has high cholesterol and she has not had her lipid panel checked for some time. She does not take a statin due to myalgias.    She reports taking antihypertensive as directed. She is monitoring home BP. She reports averaging 120/80's. She is trying to follow a low sodium diet. Denies CP, SOB, dizziness, syncope, HA.    Asthma is followed by Pulmonology, Deanne Victor PA-C.  Last seen 06/18/24.    She reports taking synthroid and forteo as directed. She is followed by Endocrinology, Dr. Aguilar for hypothyroidism and iatrogenic hypoparathyroidism Last seen 04/16/24.    She was followed by ENT, Dr. Bony Rios, for allergic rhinitis.  She does not plan to follow up any further.    Anxiety/PTSD is managed with lorazepam. PDMP reviewed.  Last fill on 08/02/24 for 60 tablets for 30 days by psychiatry, Dr. Chang. Patient also has Rx for cannabis edibles.      Assessment/Plan    Diagnoses and all orders for this visit:    Initial Medicare annual wellness visit    Primary hypertension  -     excellent control  -     amLODIPine (Norvasc) 5 mg tablet; Take 1 tablet (5 mg) by mouth once daily.  -     CBC and Auto Differential; Future    Mixed hyperlipidemia  -     intolerant to statins  -    Lipid Panel; Future    Statin myopathy    Moderate persistent asthma without complication ([facility])        -     albuterol 90 mcg inhaler as needed        -     Qvar Rehihaler 40 mcg/actuation inhaler 4x daily        -     continue established follow up with Pulmnology    Anxiety        -     Continue established follow up with psychiatry        -     lorazepam 1 mg twice daily    Allergic rhinitis, unspecified seasonality, unspecified trigger        -     Stable        -     continue OTC  antihistamines    Tobacco use disorder        -     smoking cessation encouraged    Osteoporosis, post-menopausal        -     Refusing Dexa        -     Continue Vitamin D and Calcium supplement    Postsurgical hypoparathyroidism (Multi)        -     continue established follow up with endocrinology        -     forteo injection        -     cacitriol 0.5 mcg as needed    Chronic dryness of both eyes  -     Lotemax 0.5 % ophthalmic suspension; Administer 1 drop into both eyes 4 times a day.    Pneumococcal vaccine refused    Screening mammography declined    Osteoporosis screening declined    Other orders  -     Follow Up In Primary Care - Medicare Annual; Future    ADVANCED CARE PLANNING  Advanced Care Planning was discussed with patient:  The patient has an active surrogate decision-maker on file. The patient does not have an advanced care plan on file.  Encouraged the patient to confirm that Living Will and Healthcare Power of  (HCPoA) are accurate and up to date.  Encouraged the patient to confirm that our office be provided a copy of any documentation in the event that anything changes.    ACTIVITIES OF DAILY LIVING  Basic ADLs:  Bathing: Independent, Dressing: Independent, Toileting: Independent, Transferring: Independent, Continence: Independent, Feeding: Independent.    Instrumental ADLs:  Ability to use phone: Independent, Shopping: Independent, Cooking: Independent, House-keeping: Independent, Laundry: Independent, Transportation: Independent, Medication Management: Independent, Finance Management: Independent.    Subjective   HPI  Review of Systems   Constitutional:  Negative for chills, diaphoresis, fatigue and fever.   HENT:  Negative for hearing loss and mouth sores.    Eyes:  Negative for visual disturbance.   Respiratory:  Negative for cough, chest tightness and shortness of breath.    Cardiovascular:  Negative for chest pain, palpitations and leg swelling.   Gastrointestinal:  Negative for  abdominal pain, blood in stool, nausea and vomiting.   Endocrine: Negative for cold intolerance, heat intolerance, polydipsia, polyphagia and polyuria.   Genitourinary:  Negative for dysuria, flank pain and hematuria.   Musculoskeletal:  Negative for back pain and neck pain.   Skin:  Negative for rash and wound.   Allergic/Immunologic: Positive for food allergies. Negative for environmental allergies and immunocompromised state.   Neurological:  Negative for dizziness, seizures, syncope, facial asymmetry, speech difficulty and headaches.   Hematological:  Negative for adenopathy. Does not bruise/bleed easily.   Psychiatric/Behavioral:  Negative for agitation and confusion.      Objective   Vitals:    08/14/24 1511   BP: 120/80   Pulse:    Temp:    SpO2:       Body mass index is 20.6 kg/m².  Physical Exam  Vitals and nursing note reviewed.   Constitutional:       General: She is not in acute distress.     Appearance: Normal appearance. She is not ill-appearing.   HENT:      Head: Normocephalic and atraumatic.      Right Ear: Tympanic membrane, ear canal and external ear normal. There is no impacted cerumen.      Left Ear: Tympanic membrane, ear canal and external ear normal. There is no impacted cerumen.      Nose: Nose normal.      Mouth/Throat:      Mouth: Mucous membranes are moist.      Pharynx: Oropharynx is clear. No oropharyngeal exudate or posterior oropharyngeal erythema.   Eyes:      General: No scleral icterus.        Right eye: No discharge.         Left eye: No discharge.      Extraocular Movements: Extraocular movements intact.      Conjunctiva/sclera: Conjunctivae normal.      Pupils: Pupils are equal, round, and reactive to light.   Neck:      Vascular: No carotid bruit.   Cardiovascular:      Rate and Rhythm: Normal rate and regular rhythm.      Pulses: Normal pulses.      Heart sounds: Normal heart sounds. No murmur heard.  Pulmonary:      Effort: Pulmonary effort is normal. No respiratory distress.  "     Breath sounds: Normal breath sounds.   Abdominal:      General: Abdomen is flat. Bowel sounds are normal. There is no distension.      Palpations: Abdomen is soft. There is no mass.      Tenderness: There is no abdominal tenderness. There is no right CVA tenderness or left CVA tenderness.      Hernia: No hernia is present.   Musculoskeletal:         General: No tenderness. Normal range of motion.      Cervical back: No tenderness.      Right lower leg: No edema.      Left lower leg: No edema.   Feet:      Comments:     Lymphadenopathy:      Cervical: No cervical adenopathy.   Skin:     General: Skin is warm and dry.      Coloration: Skin is not jaundiced.      Findings: No rash.   Neurological:      General: No focal deficit present.      Mental Status: She is alert and oriented to person, place, and time. Mental status is at baseline.   Psychiatric:         Mood and Affect: Mood normal.         Behavior: Behavior normal.       Diagnostic Results   Lab Results   Component Value Date    GLUCOSE 101 (H) 08/12/2024    CALCIUM 9.2 08/12/2024     08/12/2024    K 3.6 08/12/2024    CO2 25 08/12/2024     08/12/2024    BUN 15 08/12/2024    CREATININE 0.60 08/12/2024     Lab Results   Component Value Date    ALT 20 04/03/2024    AST 17 04/03/2024    ALKPHOS 109 04/03/2024    BILITOT 0.4 04/03/2024     Lab Results   Component Value Date    WBC 10.8 04/03/2024    HGB 15.2 04/03/2024    HCT 48.4 (H) 04/03/2024    MCV 86 04/03/2024     04/03/2024     No results found for: \"CHOL\"  No results found for: \"HDL\"  No results found for: \"LDLCALC\"  No results found for: \"TRIG\"  No components found for: \"CHOLHDL\"  Lab Results   Component Value Date    HGBA1C 6.3 (H) 06/03/2022     Other labs not included in the list above reviewed either before or during this encounter.    History   Past Medical History:   Diagnosis Date    Acute cystitis 03/25/2024    Allergic     Asthma ([facility])     Bilateral shoulder pain " 03/25/2024    Cough 03/25/2024    Disease of thyroid gland     Encounter for general adult medical examination without abnormal findings 05/29/2013    Physical exam, routine    Feeling weak 03/25/2024    Foot abscess, right 03/25/2024    Hashimoto's thyroiditis     Hyperparathyroidism (Multi)     Hypertension     Hypo-osmolality and hyponatremia     Hyponatremia    Hypothyroidism     Influenza 03/25/2024    Limb pain 03/25/2024    Other hypoglycemia 10/27/2021    Hyperinsulinemic hypoglycemia    Personal history of other diseases of the circulatory system     History of rheumatic fever    Personal history of other diseases of the circulatory system     History of hypertension    Personal history of other diseases of the female genital tract     History of polycystic ovarian syndrome    Personal history of other specified conditions     History of shortness of breath    Polycystic ovary syndrome     Sjogren syndrome, unspecified (Multi) 12/01/2021    Sjogrens syndrome     Past Surgical History:   Procedure Laterality Date    BACK SURGERY      EYE SURGERY  09/23/2014    Eye Surgery    LUMBAR LAMINECTOMY  09/23/2014    Laminectomy Lumbar    OTHER SURGICAL HISTORY  10/15/2013    Parathyroid Complete Parathyroidectomy    PARATHYROID GLAND SURGERY      TOTAL ABDOMINAL HYSTERECTOMY W/ BILATERAL SALPINGOOPHORECTOMY  10/15/2013    Total Abdominal Hysterectomy With Removal Of Both Ovaries    WISDOM TOOTH EXTRACTION       Family History   Problem Relation Name Age of Onset    Diabetes Mother Mother     Stroke Mother Mother     Other (end stage renal) Mother Mother     Diabetes Father Father     Coronary artery disease Father Father      Social History     Socioeconomic History    Marital status: Single     Spouse name: Not on file    Number of children: Not on file    Years of education: Not on file    Highest education level: Not on file   Occupational History    Not on file   Tobacco Use    Smoking status: Every Day      Current packs/day: 0.50     Average packs/day: 0.5 packs/day for 15.0 years (7.5 ttl pk-yrs)     Types: Cigarettes    Smokeless tobacco: Never   Substance and Sexual Activity    Alcohol use: Not Currently    Drug use: Not Currently     Types: Marijuana     Comment: medical    Sexual activity: Yes     Partners: Male   Other Topics Concern    Not on file   Social History Narrative    Not on file     Social Determinants of Health     Financial Resource Strain: Unknown (12/2/2022)    Received from [facility]    Overall Financial Resource Strain (CARDIA)     Difficulty of Paying Living Expenses: Patient declined   Food Insecurity: Unknown (12/2/2022)    Received from [facility]    Hunger Vital Sign     Worried About Running Out of Food in the Last Year: Patient declined     Ran Out of Food in the Last Year: Patient declined   Transportation Needs: Unknown (12/2/2022)    Received from [facility]    PRAPARE - Transportation     Lack of Transportation (Medical): Patient declined     Lack of Transportation (Non-Medical): Patient declined   Physical Activity: Unknown (12/2/2022)    Received from [facility]    Exercise Vital Sign     Days of Exercise per Week: Patient declined     Minutes of Exercise per Session: Patient declined   Stress: Unknown (12/2/2022)    Received from [facility]    [employer] [place] [employer] - Occupational Stress Questionnaire     Feeling of Stress : Patient declined   Social Connections: Unknown (12/2/2022)    Received from [facility]    Social Connection and Isolation Panel [NHANES]     Frequency of Communication with Friends and Family: Patient declined     Frequency of Social Gatherings with Friends and Family: Patient declined     Attends Jainism Services: Patient declined     Active Member of Clubs or Organizations: Patient declined      Attends Club or Organization Meetings: Patient declined     Marital Status: Patient declined   Intimate Partner Violence: Not on file   Housing Stability: Unknown (12/2/2022)    [facility]    Housing Stability Vital Sign     Unable to Pay for Housing in the Last Year: Patient refused     Number of Places Lived in the Last Year: Not on file     In the last 12 months, was there a time when you did not have a steady place to sleep or slept in a shelter (including now)?: Patient refused     Allergies   Allergen Reactions    Aripiprazole Confusion, Hallucinations, Other, Seizure and Unknown     seizures    Aspirin Rash, Shortness of breath and Unknown    Bupropion Hcl Seizure and Unknown    Calcitriol Myalgia, Nausea And Vomiting, Nausea Only, Other, Shortness of breath and Unknown    Cephalosporins Other, Syncope and Unknown     decrease BP; fainting    Fluticasone Propion-Salmeterol Other     chest pain    Hydrochlorothiazide GI Upset and Unknown    Influenza Virus Vaccines Other and Unknown    Nsaids (Non-Steroidal Anti-Inflammatory Drug) GI Upset    Peanut Shortness of breath    Tramadol Other    Tuna Oil Shortness of breath    Cortisone Confusion, Other, Hallucinations and Unknown    Fluticasone Propionate Unknown    Hydroxychloroquine Other and Unknown     weakness    Iodine Unknown, Nausea/vomiting and Other    Methylcellulose Nausea And Vomiting and Rash    Metoprolol Dizziness, Drowsiness, Myalgia and Unknown    Prochlorperazine Other    Promethazine Unknown    Statins-Hmg-Coa Reductase Inhibitors Myalgia    Vancomycin Other, Unknown and Bashir-Eligio syndrome    Acetaminophen Unknown    Azithromycin Other    Budesonide Unknown    Budesonide-Formoterol Other and Unknown     chest pain    Bupropion Other     Seizure    Ciprofloxacin Other and Unknown    Clarithromycin Unknown    Cocoa Other    Corticosteroids (Glucocorticoids) GI Upset     with anxiety    hyper, mental  status    Doxycycline GI Upset    Erythromycin Base Nausea/vomiting     Tolerates Biaxin and Zithromax    Fentanyl Unknown    Gluten Unknown    Levalbuterol Other     Tachycardia, dizziness    Levofloxacin Unknown    Liothyronine Unknown    Ondansetron Hcl Unknown    Prednisone Unknown    Sennosides Unknown    Bacitracin Unknown, Itching and Rash     intense itching and redness when used post eye surgery    Codeine Rash, Unknown and Nausea/vomiting    Cyclobenzaprine Other and Unknown     muscle weakness    muscle weakness with all muscle relaxers    Fosfomycin Other and Nausea Only    Iodinated Contrast Media GI Upset and Nausea/vomiting    Lactose Nausea Only, Other and Unknown    Latex Rash    Meloxicam Nausea Only    Montelukast Rash and Unknown    Montelukast Sodium Rash    Morphine Rash and Nausea/vomiting    Oxycodone Rash, Nausea/vomiting and Unknown    Sulfa (Sulfonamide Antibiotics) Rash     Current Outpatient Medications on File Prior to Visit   Medication Sig Dispense Refill    albuterol 90 mcg/actuation inhaler INHALE 2 PUFFS BY MOUTH AS DIRECTED EVERY 4 HOURS AS NEEDED FOR WHEEZING OR SHORTNESS OBREATH      calcitriol (Rocaltrol) 0.25 mcg capsule Take 4 capsules (1 mcg) by mouth once daily. Take 2 capsules every 12 hours (Patient taking differently: Take 2 capsules (0.5 mcg) by mouth if needed.) 90 capsule 3    cholecalciferol (Vitamin D-3) 50 mcg (2,000 unit) capsule Take 2 capsules (100 mcg) by mouth once daily.      Cytomel 5 mcg tablet Take 1.5 tablets (7.5 mcg) by mouth once daily. Take 1 and 1/2 (one and one-half) tablets by mouth daily. 90 tablet 3    Forteo injection USE AS DIRECTED TWICE DAILY 3 each 11    hydrocortisone (Anusol-HC) 2.5 % rectal cream APPLY RECTALLY TO THE AFFECTED AREA TWICE DAILY AS DIRECTED      LACTOBACILLUS ACIDOPHILUS ORAL Take 1-2 capsules by mouth once daily.      LORazepam (Ativan) 1 mg tablet Take 1 tablet (1 mg) by mouth 2 times a day.      MAGNESIUM ORAL Take 200  "mg by mouth. 4 tabs daily      Qvar RediHaler 40 mcg/actuation inhaler Inhale 3 Inhalations 4 times a day.      Synthroid 100 mcg tablet Take 1 tablet (100 mcg) by mouth once daily in the morning. Take before meals. 90 tablet 3    vitamin K2 40 mcg tablet Take 100 mcg by mouth.      [DISCONTINUED] amLODIPine (Norvasc) 5 mg tablet Take 1 tablet (5 mg) by mouth once daily.      [DISCONTINUED] blood sugar diagnostic (ONETOUCH ULTRA BLUE TEST STRIP MISC) 1-2 times a week when feeling like the blood sugar levels are dropping      [DISCONTINUED] Lotemax 0.5 % ophthalmic suspension INSTILL 1 DROP IN BOTH EYES FOUR TIMES DAILY      [DISCONTINUED] MAGNESIUM ORAL Take by mouth. LIQUID FORM      calcitriol (Rocaltrol) 0.5 mcg capsule Take 1 capsule (0.5 mcg) by mouth every 12 hours. (Patient not taking: Reported on 4/16/2024) 30 capsule 11    insulin syringe-needle U-100 31G X 5/16\" 0.3 mL syringe USE THREE TIMES DAILY (Patient not taking: Reported on 4/16/2024) 100 each 11    [DISCONTINUED] azelastine (Astelin) 137 mcg (0.1 %) nasal spray Administer 1 spray into each nostril 2 times a day.      [DISCONTINUED] calcium carbonate (Tums) 200 mg calcium chewable tablet every 6 hours.      [DISCONTINUED] calcium citrate-vitamin D3 315 mg-6.25 mcg (250 unit) tablet Take by mouth 4 times a day.      [DISCONTINUED] cranberry fruit extract (cranberry extract) 250 mg capsule once every 24 hours.      [DISCONTINUED] fluticasone (Flonase) 50 mcg/actuation nasal spray Administer 1 spray into each nostril 2 times a day.      [DISCONTINUED] glucagon (Glucagen) 1 mg injection use as directed for  hypoglycemia      [DISCONTINUED] levalbuterol (Xopenex HFA) 45 mcg/actuation inhaler every 8 hours.      [DISCONTINUED] levalbuterol (Xopenex) 0.63 mg/3 mL nebulizer solution every 8 hours.      [DISCONTINUED] magnesium 250 mg tablet Take by mouth.      [DISCONTINUED] magnesium glycinate 100 mg tablet Take 400 mg by mouth 3 times a day.      " [DISCONTINUED] mometasone (Nasonex) 50 mcg/actuation nasal spray every 12 hours.      [DISCONTINUED] nizatidine (Axid) 150 mg capsule every 8 hours.      [DISCONTINUED] ondansetron (Zofran) 4 mg tablet as directed Orally prn      [DISCONTINUED] propylene glycol-glycerin 1-0.3 % drops Administer 1 vial into affected eye(s).       No current facility-administered medications on file prior to visit.     Immunization History   Administered Date(s) Administered    Influenza Whole 09/01/2009    Influenza, Unspecified 11/15/2005, 10/25/2006, 10/17/2008    Pneumococcal polysaccharide vaccine, 23-valent, age 2 years and older (PNEUMOVAX 23) 01/01/2004     Patient's medical history was reviewed and updated either before or during this encounter.     Irene Bailey, APRN-CNP

## 2024-08-17 LAB
TESTOSTERONE FREE (CHAN): 0.6 PG/ML (ref 0.1–6.4)
TESTOSTERONE,TOTAL,LC-MS/MS: 8 NG/DL (ref 2–45)

## 2024-09-10 ENCOUNTER — TELEPHONE (OUTPATIENT)
Dept: PRIMARY CARE | Facility: CLINIC | Age: 64
End: 2024-09-10
Payer: COMMERCIAL

## 2024-09-10 NOTE — TELEPHONE ENCOUNTER
Patient states that when she saw zan liz they talked about protocone for Hemorid's and was wondering if you would be willing to give it please advise

## 2024-09-24 DIAGNOSIS — E89.2 POST-SURGICAL HYPOPARATHYROIDISM (MULTI): ICD-10-CM

## 2024-09-24 DIAGNOSIS — E83.51 HYPOCALCEMIA: ICD-10-CM

## 2024-09-24 DIAGNOSIS — G90.A POTS (POSTURAL ORTHOSTATIC TACHYCARDIA SYNDROME): ICD-10-CM

## 2024-09-24 DIAGNOSIS — E03.8 HYPOTHYROIDISM DUE TO HASHIMOTO'S THYROIDITIS: ICD-10-CM

## 2024-09-24 DIAGNOSIS — E06.3 HYPOTHYROIDISM DUE TO HASHIMOTO'S THYROIDITIS: ICD-10-CM

## 2024-10-14 ENCOUNTER — LAB (OUTPATIENT)
Dept: LAB | Facility: LAB | Age: 64
End: 2024-10-14
Payer: COMMERCIAL

## 2024-10-14 DIAGNOSIS — E89.2 POST-SURGICAL HYPOPARATHYROIDISM (MULTI): ICD-10-CM

## 2024-10-14 DIAGNOSIS — E83.51 HYPOCALCEMIA: ICD-10-CM

## 2024-10-14 DIAGNOSIS — G90.A POTS (POSTURAL ORTHOSTATIC TACHYCARDIA SYNDROME): ICD-10-CM

## 2024-10-14 DIAGNOSIS — E06.3 HYPOTHYROIDISM DUE TO HASHIMOTO'S THYROIDITIS: ICD-10-CM

## 2024-10-14 LAB
ANION GAP SERPL CALCULATED.3IONS-SCNC: 13 MMOL/L (ref 10–20)
BUN SERPL-MCNC: 13 MG/DL (ref 6–23)
CALCIUM SERPL-MCNC: 9.1 MG/DL (ref 8.6–10.3)
CHLORIDE SERPL-SCNC: 105 MMOL/L (ref 98–107)
CO2 SERPL-SCNC: 25 MMOL/L (ref 21–32)
CREAT SERPL-MCNC: 0.54 MG/DL (ref 0.5–1.05)
EGFRCR SERPLBLD CKD-EPI 2021: >90 ML/MIN/1.73M*2
GLUCOSE SERPL-MCNC: 73 MG/DL (ref 74–99)
POTASSIUM SERPL-SCNC: 3.9 MMOL/L (ref 3.5–5.3)
SODIUM SERPL-SCNC: 139 MMOL/L (ref 136–145)
T4 FREE SERPL-MCNC: 1.02 NG/DL (ref 0.61–1.12)
TSH SERPL-ACNC: 0.07 MIU/L (ref 0.44–3.98)

## 2024-10-14 PROCEDURE — 84481 FREE ASSAY (FT-3): CPT

## 2024-10-14 PROCEDURE — 82330 ASSAY OF CALCIUM: CPT

## 2024-10-14 PROCEDURE — 84443 ASSAY THYROID STIM HORMONE: CPT

## 2024-10-14 PROCEDURE — 80048 BASIC METABOLIC PNL TOTAL CA: CPT

## 2024-10-14 PROCEDURE — 36415 COLL VENOUS BLD VENIPUNCTURE: CPT

## 2024-10-14 PROCEDURE — 84439 ASSAY OF FREE THYROXINE: CPT

## 2024-10-15 LAB
CA-I BLD-SCNC: 1.17 MMOL/L (ref 1.1–1.33)
T3FREE SERPL-MCNC: 3.4 PG/ML (ref 2.3–4.2)

## 2024-10-28 DIAGNOSIS — E06.3 HYPOTHYROIDISM DUE TO HASHIMOTO'S THYROIDITIS: ICD-10-CM

## 2024-10-28 DIAGNOSIS — E83.51 HYPOCALCEMIA: ICD-10-CM

## 2024-10-28 RX ORDER — LIOTHYRONINE SODIUM 5 UG/1
7.5 TABLET ORAL DAILY
Qty: 90 TABLET | Refills: 3 | Status: SHIPPED | OUTPATIENT
Start: 2024-10-28

## 2024-11-24 ENCOUNTER — PATIENT MESSAGE (OUTPATIENT)
Dept: PRIMARY CARE | Facility: CLINIC | Age: 64
End: 2024-11-24
Payer: COMMERCIAL

## 2024-11-24 DIAGNOSIS — I10 PRIMARY HYPERTENSION: ICD-10-CM

## 2024-11-29 RX ORDER — AMLODIPINE BESYLATE 5 MG/1
2.5 TABLET ORAL DAILY
Qty: 90 TABLET | Refills: 1 | Status: SHIPPED | OUTPATIENT
Start: 2024-11-29

## 2024-12-03 ENCOUNTER — TELEPHONE (OUTPATIENT)
Dept: PRIMARY CARE | Facility: CLINIC | Age: 64
End: 2024-12-03
Payer: COMMERCIAL

## 2024-12-03 DIAGNOSIS — I10 PRIMARY HYPERTENSION: Primary | ICD-10-CM

## 2024-12-03 RX ORDER — AMLODIPINE BESYLATE 2.5 MG/1
2.5 TABLET ORAL DAILY
COMMUNITY
End: 2024-12-03 | Stop reason: SDUPTHER

## 2024-12-03 RX ORDER — AMLODIPINE BESYLATE 2.5 MG/1
2.5 TABLET ORAL DAILY
Qty: 90 TABLET | Refills: 3 | Status: SHIPPED | OUTPATIENT
Start: 2024-12-03

## 2024-12-03 NOTE — TELEPHONE ENCOUNTER
Pt requests new rx for amlodipine 2.5 mg. Pt has rx for 5 mg and splits in half, but 5 mg tabs are no longer scored.     Pt request rx for amlodipine 2.5 mg tablets Reg[facility] White .     Lv 8/14/24 nv 8/20/25

## 2024-12-03 NOTE — TELEPHONE ENCOUNTER
"Pt sent a [employer] message last week re: amlodipine that reads:    \"I would like to get an rx for amlodipine 2.5 mg. That was my original dose years ago. It was increased to 5 mg. And I was told to cut them in half depending on my BP. I’ve been cutting them in half for two years, my BP is fine, and I can no longer get the scored 5 mg amlodipine from [facility]. The pharmacist suggested I go back to the 2.5 mg.tabs. It would be easier for me and more accurate dosing. I check my BP daily. Please let me know. Thanks\"    So, Rx was sent to  [facility] for 5 mg tabs, take 0.5 tab daily.  Pt states the 5 mg tabs are not scored and she cannot get an accurate dose.  Requesting chart be updated to reflect 2.5 mg daily and send new Rx to [facility].  Pt states she did not  the 5.0 mg tabs that were Rx'd 11/29/24.  Please advise.      LV 8/14/24, NV 8/20/25  "

## 2025-01-13 DIAGNOSIS — M81.0 OSTEOPOROSIS, UNSPECIFIED OSTEOPOROSIS TYPE, UNSPECIFIED PATHOLOGICAL FRACTURE PRESENCE: ICD-10-CM

## 2025-01-13 RX ORDER — TERIPARATIDE 250 UG/ML
INJECTION, SOLUTION SUBCUTANEOUS
Qty: 3 EACH | Refills: 11 | Status: SHIPPED | OUTPATIENT
Start: 2025-01-13

## 2025-02-06 DIAGNOSIS — E83.51 HYPOCALCEMIA: ICD-10-CM

## 2025-02-06 DIAGNOSIS — E06.3 HYPOTHYROIDISM DUE TO HASHIMOTO'S THYROIDITIS: ICD-10-CM

## 2025-02-06 RX ORDER — LEVOTHYROXINE SODIUM 100 UG/1
100 TABLET ORAL
Qty: 90 TABLET | Refills: 3 | Status: SHIPPED | OUTPATIENT
Start: 2025-02-06

## 2025-02-17 DIAGNOSIS — E20.9 HYPOPARATHYROIDISM, UNSPECIFIED HYPOPARATHYROIDISM TYPE (MULTI): ICD-10-CM

## 2025-02-17 RX ORDER — CALCIUM CARB/VITAMIN D3/VIT K1 500-100-40
TABLET,CHEWABLE ORAL
Qty: 200 EACH | Refills: 0 | Status: SHIPPED | OUTPATIENT
Start: 2025-02-17 | End: 2026-02-17

## 2025-03-03 ENCOUNTER — TELEPHONE (OUTPATIENT)
Dept: PRIMARY CARE | Facility: CLINIC | Age: 65
End: 2025-03-03
Payer: COMMERCIAL

## 2025-03-03 DIAGNOSIS — R07.81 RIB PAIN ON LEFT SIDE: Primary | ICD-10-CM
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
## 2025-03-03 NOTE — TELEPHONE ENCOUNTER
Please contact patient. Will need to know how it happened (fall trauma?) and where the pain is to determine appropriate orders. Thank you.

## 2025-03-04 ENCOUNTER — HOSPITAL ENCOUNTER (OUTPATIENT)
Dept: RADIOLOGY | Facility: CLINIC | Age: 65
Discharge: HOME | End: 2025-03-04
Payer: COMMERCIAL

## 2025-03-04 DIAGNOSIS — R07.81 RIB PAIN ON LEFT SIDE: ICD-10-CM

## 2025-03-04 PROCEDURE — 71101 X-RAY EXAM UNILAT RIBS/CHEST: CPT | Mod: LT

## 2025-04-08 DIAGNOSIS — E20.9 HYPOPARATHYROIDISM, UNSPECIFIED HYPOPARATHYROIDISM TYPE (MULTI): ICD-10-CM

## 2025-04-08 RX ORDER — CALCIUM CARB/VITAMIN D3/VIT K1 500-100-40
TABLET,CHEWABLE ORAL
Qty: 200 EACH | Refills: 1 | Status: SHIPPED | OUTPATIENT
Start: 2025-04-08 | End: 2026-04-08

## 2025-04-27 ENCOUNTER — PATIENT MESSAGE (OUTPATIENT)
Dept: PRIMARY CARE | Facility: CLINIC | Age: 65
End: 2025-04-27
Payer: COMMERCIAL

## 2025-04-27 DIAGNOSIS — H04.123 CHRONIC DRYNESS OF BOTH EYES: ICD-10-CM

## 2025-04-28 RX ORDER — LOTEPREDNOL ETABONATE 5 MG/ML
1 SUSPENSION/ DROPS OPHTHALMIC 4 TIMES DAILY
Qty: 5 ML | Refills: 0 | Status: SHIPPED | OUTPATIENT
Start: 2025-04-28

## 2025-05-08 DIAGNOSIS — H04.123 CHRONIC DRYNESS OF BOTH EYES: ICD-10-CM

## 2025-05-08 RX ORDER — LOTEPREDNOL ETABONATE 5 MG/ML
1 SUSPENSION/ DROPS OPHTHALMIC 4 TIMES DAILY
Qty: 15 ML | Refills: 1 | Status: SHIPPED | OUTPATIENT
Start: 2025-05-08

## 2025-05-12 ENCOUNTER — PATIENT MESSAGE (OUTPATIENT)
Dept: PRIMARY CARE | Facility: CLINIC | Age: 65
End: 2025-05-12
Payer: COMMERCIAL

## 2025-05-12 ENCOUNTER — TELEPHONE (OUTPATIENT)
Dept: PRIMARY CARE | Facility: CLINIC | Age: 65
End: 2025-05-12
Payer: COMMERCIAL

## 2025-05-12 DIAGNOSIS — H04.123 CHRONIC DRYNESS OF BOTH EYES: Primary | ICD-10-CM

## 2025-05-12 DIAGNOSIS — H04.123 CHRONIC DRYNESS OF BOTH EYES: ICD-10-CM

## 2025-05-12 RX ORDER — LOTEPREDNOL ETABONATE 5 MG/ML
1 SUSPENSION/ DROPS OPHTHALMIC 4 TIMES DAILY
Qty: 15 ML | Refills: 1 | Status: SHIPPED | OUTPATIENT
Start: 2025-05-12

## 2025-05-12 NOTE — TELEPHONE ENCOUNTER
Pt states the [place] pharmacy does not have the Lotemax drops and she can not use the generic. Pt states [facility] has the Lotemax SM. Pt asking if she can have that prescription.

## 2025-05-13 RX ORDER — LOTEPREDNOL ETABONATE 3.8 MG/G
1 GEL OPHTHALMIC 4 TIMES DAILY
Qty: 15 ML | Refills: 1 | Status: SHIPPED | OUTPATIENT
Start: 2025-05-13

## 2025-06-16 ENCOUNTER — TELEPHONE (OUTPATIENT)
Dept: ENDOCRINOLOGY | Facility: CLINIC | Age: 65
End: 2025-06-16
Payer: COMMERCIAL

## 2025-06-16 DIAGNOSIS — E83.51 HYPOCALCEMIA: ICD-10-CM

## 2025-06-16 DIAGNOSIS — E06.3 HYPOTHYROIDISM DUE TO HASHIMOTO'S THYROIDITIS: ICD-10-CM

## 2025-06-16 RX ORDER — LIOTHYRONINE SODIUM 5 UG/1
TABLET ORAL
Qty: 90 TABLET | Refills: 2 | OUTPATIENT
Start: 2025-06-16

## 2025-06-16 NOTE — TELEPHONE ENCOUNTER
Nilda, can you add her somewhere in December or 2026 to review her health  She can see Catalina for med refill in between

## 2025-06-19 DIAGNOSIS — E83.51 HYPOCALCEMIA: ICD-10-CM

## 2025-06-19 DIAGNOSIS — E06.3 HYPOTHYROIDISM DUE TO HASHIMOTO'S THYROIDITIS: ICD-10-CM

## 2025-06-20 RX ORDER — LIOTHYRONINE SODIUM 5 UG/1
TABLET ORAL
Qty: 90 TABLET | Refills: 2 | Status: SHIPPED | OUTPATIENT
Start: 2025-06-20

## 2025-06-23 ENCOUNTER — PATIENT MESSAGE (OUTPATIENT)
Dept: PRIMARY CARE | Facility: CLINIC | Age: 65
End: 2025-06-23
Payer: COMMERCIAL

## 2025-06-23 DIAGNOSIS — K64.9 HEMORRHOIDS, UNSPECIFIED HEMORRHOID TYPE: Primary | ICD-10-CM

## 2025-06-23 RX ORDER — HYDROCORTISONE 25 MG/G
CREAM TOPICAL 2 TIMES DAILY
Qty: 28 G | Refills: 2 | Status: SHIPPED | OUTPATIENT
Start: 2025-06-23

## 2025-07-15 LAB — CA-I SERPL-MCNC: 5 MG/DL (ref 4.7–5.5)

## 2025-07-16 LAB
T3FREE SERPL-MCNC: 3.2 PG/ML (ref 2.3–4.2)
T4 FREE SERPL-MCNC: 1.5 NG/DL (ref 0.8–1.8)
TSH SERPL-ACNC: 0.13 MIU/L (ref 0.4–4.5)

## 2025-08-14 ENCOUNTER — APPOINTMENT (OUTPATIENT)
Dept: ENDOCRINOLOGY | Facility: CLINIC | Age: 65
End: 2025-08-14
Payer: COMMERCIAL

## 2025-08-14 VITALS — BODY MASS INDEX: 18.88 KG/M2 | WEIGHT: 100 LBS | HEIGHT: 61 IN

## 2025-08-14 DIAGNOSIS — M81.0 OSTEOPOROSIS, POST-MENOPAUSAL: ICD-10-CM

## 2025-08-14 DIAGNOSIS — E89.2 POSTSURGICAL HYPOPARATHYROIDISM (MULTI): ICD-10-CM

## 2025-08-14 DIAGNOSIS — M32.9 SYSTEMIC LUPUS ERYTHEMATOSUS, UNSPECIFIED SLE TYPE, UNSPECIFIED ORGAN INVOLVEMENT STATUS (MULTI): ICD-10-CM

## 2025-08-14 DIAGNOSIS — E06.3 HYPOTHYROIDISM DUE TO HASHIMOTO'S THYROIDITIS: Primary | ICD-10-CM

## 2025-08-14 DIAGNOSIS — E83.51 HYPOCALCEMIA: ICD-10-CM

## 2025-08-14 PROCEDURE — 1123F ACP DISCUSS/DSCN MKR DOCD: CPT | Performed by: INTERNAL MEDICINE

## 2025-08-14 PROCEDURE — 1160F RVW MEDS BY RX/DR IN RCRD: CPT | Performed by: INTERNAL MEDICINE

## 2025-08-14 PROCEDURE — 3008F BODY MASS INDEX DOCD: CPT | Performed by: INTERNAL MEDICINE

## 2025-08-14 PROCEDURE — 99214 OFFICE O/P EST MOD 30 MIN: CPT | Performed by: INTERNAL MEDICINE

## 2025-08-14 PROCEDURE — 1159F MED LIST DOCD IN RCRD: CPT | Performed by: INTERNAL MEDICINE

## 2025-08-14 PROCEDURE — G2211 COMPLEX E/M VISIT ADD ON: HCPCS | Performed by: INTERNAL MEDICINE

## 2025-08-14 RX ORDER — LEVOTHYROXINE SODIUM 88 UG/1
88 TABLET ORAL DAILY
Qty: 30 TABLET | Refills: 11 | Status: SHIPPED | OUTPATIENT
Start: 2025-08-14 | End: 2026-08-14

## 2025-08-14 ASSESSMENT — PATIENT HEALTH QUESTIONNAIRE - PHQ9
SUM OF ALL RESPONSES TO PHQ9 QUESTIONS 1 AND 2: 0
1. LITTLE INTEREST OR PLEASURE IN DOING THINGS: NOT AT ALL
2. FEELING DOWN, DEPRESSED OR HOPELESS: NOT AT ALL

## 2025-08-15 DIAGNOSIS — E06.3 HYPOTHYROIDISM DUE TO HASHIMOTO'S THYROIDITIS: ICD-10-CM

## 2025-08-15 DIAGNOSIS — E89.2 POSTSURGICAL HYPOPARATHYROIDISM (MULTI): ICD-10-CM

## 2025-08-15 DIAGNOSIS — M81.0 OSTEOPOROSIS, POST-MENOPAUSAL: ICD-10-CM

## 2025-08-15 DIAGNOSIS — E83.51 HYPOCALCEMIA: ICD-10-CM

## 2025-08-19 ENCOUNTER — TELEPHONE (OUTPATIENT)
Dept: PRIMARY CARE | Facility: CLINIC | Age: 65
End: 2025-08-19
Payer: COMMERCIAL

## 2025-08-19 DIAGNOSIS — Z13.820 ENCOUNTER FOR SCREENING FOR OSTEOPOROSIS: ICD-10-CM

## 2025-08-19 DIAGNOSIS — E03.9 ACQUIRED HYPOTHYROIDISM: ICD-10-CM

## 2025-08-19 DIAGNOSIS — Z78.0 MENOPAUSE: ICD-10-CM

## 2025-08-19 DIAGNOSIS — I10 PRIMARY HYPERTENSION: Primary | ICD-10-CM

## 2025-08-19 DIAGNOSIS — Z11.59 NEED FOR HEPATITIS C SCREENING TEST: ICD-10-CM

## 2025-08-19 DIAGNOSIS — Z12.31 ENCOUNTER FOR SCREENING MAMMOGRAM FOR BREAST CANCER: ICD-10-CM

## 2025-08-19 DIAGNOSIS — E78.2 MIXED HYPERLIPIDEMIA: ICD-10-CM

## 2025-08-20 ENCOUNTER — APPOINTMENT (OUTPATIENT)
Dept: PRIMARY CARE | Facility: CLINIC | Age: 65
End: 2025-08-20
Payer: COMMERCIAL

## 2025-08-22 DIAGNOSIS — E89.2 POSTSURGICAL HYPOPARATHYROIDISM (MULTI): ICD-10-CM

## 2025-08-22 DIAGNOSIS — E83.51 HYPOCALCEMIA: ICD-10-CM

## 2025-08-22 DIAGNOSIS — E06.3 HYPOTHYROIDISM DUE TO HASHIMOTO'S THYROIDITIS: ICD-10-CM

## 2025-08-22 DIAGNOSIS — M81.0 OSTEOPOROSIS, POST-MENOPAUSAL: ICD-10-CM

## 2025-08-22 LAB
ALBUMIN SERPL-MCNC: 4.7 G/DL (ref 3.6–5.1)
ALP SERPL-CCNC: 146 U/L (ref 37–153)
ALT SERPL-CCNC: 18 U/L (ref 6–29)
ANION GAP SERPL CALCULATED.4IONS-SCNC: 12 MMOL/L (CALC) (ref 7–17)
ANION GAP SERPL CALCULATED.4IONS-SCNC: 9 MMOL/L (CALC) (ref 7–17)
AST SERPL-CCNC: 17 U/L (ref 10–35)
BASOPHILS # BLD AUTO: 62 CELLS/UL (ref 0–200)
BASOPHILS NFR BLD AUTO: 0.6 %
BILIRUB SERPL-MCNC: 0.4 MG/DL (ref 0.2–1.2)
BUN SERPL-MCNC: 11 MG/DL (ref 7–25)
BUN SERPL-MCNC: 12 MG/DL (ref 7–25)
BUN/CREAT SERPL: NORMAL (CALC) (ref 6–22)
CA-I SERPL-MCNC: 5.3 MG/DL (ref 4.7–5.5)
CALCIUM SERPL-MCNC: 10 MG/DL (ref 8.6–10.4)
CALCIUM SERPL-MCNC: 9.9 MG/DL (ref 8.6–10.4)
CHLORIDE SERPL-SCNC: 102 MMOL/L (ref 98–110)
CHLORIDE SERPL-SCNC: 102 MMOL/L (ref 98–110)
CO2 SERPL-SCNC: 27 MMOL/L (ref 20–32)
CO2 SERPL-SCNC: 29 MMOL/L (ref 20–32)
CREAT SERPL-MCNC: 0.5 MG/DL (ref 0.5–1.05)
CREAT SERPL-MCNC: 0.5 MG/DL (ref 0.5–1.05)
EGFRCR SERPLBLD CKD-EPI 2021: 103 ML/MIN/1.73M2
EGFRCR SERPLBLD CKD-EPI 2021: 103 ML/MIN/1.73M2
EOSINOPHIL # BLD AUTO: 125 CELLS/UL (ref 15–500)
EOSINOPHIL NFR BLD AUTO: 1.2 %
ERYTHROCYTE [DISTWIDTH] IN BLOOD BY AUTOMATED COUNT: 12.7 % (ref 11–15)
GLUCOSE SERPL-MCNC: 90 MG/DL (ref 65–139)
GLUCOSE SERPL-MCNC: 93 MG/DL (ref 65–139)
HCT VFR BLD AUTO: 43.5 % (ref 35–45)
HCV AB SERPL QL IA: NORMAL
HGB BLD-MCNC: 14.1 G/DL (ref 11.7–15.5)
LYMPHOCYTES # BLD AUTO: 3068 CELLS/UL (ref 850–3900)
LYMPHOCYTES NFR BLD AUTO: 29.5 %
MCH RBC QN AUTO: 29.1 PG (ref 27–33)
MCHC RBC AUTO-ENTMCNC: 32.4 G/DL (ref 32–36)
MCV RBC AUTO: 89.7 FL (ref 80–100)
MONOCYTES # BLD AUTO: 707 CELLS/UL (ref 200–950)
MONOCYTES NFR BLD AUTO: 6.8 %
NEUTROPHILS # BLD AUTO: 6438 CELLS/UL (ref 1500–7800)
NEUTROPHILS NFR BLD AUTO: 61.9 %
PLATELET # BLD AUTO: 355 THOUSAND/UL (ref 140–400)
PMV BLD REES-ECKER: 9.8 FL (ref 7.5–12.5)
POTASSIUM SERPL-SCNC: 3.7 MMOL/L (ref 3.5–5.3)
POTASSIUM SERPL-SCNC: 3.8 MMOL/L (ref 3.5–5.3)
PROT SERPL-MCNC: 7.4 G/DL (ref 6.1–8.1)
PTH-INTACT SERPL-MCNC: 6 PG/ML (ref 16–77)
RBC # BLD AUTO: 4.85 MILLION/UL (ref 3.8–5.1)
SODIUM SERPL-SCNC: 140 MMOL/L (ref 135–146)
SODIUM SERPL-SCNC: 141 MMOL/L (ref 135–146)
WBC # BLD AUTO: 10.4 THOUSAND/UL (ref 3.8–10.8)

## 2025-08-25 ENCOUNTER — OFFICE VISIT (OUTPATIENT)
Dept: PRIMARY CARE | Facility: CLINIC | Age: 65
End: 2025-08-25
Payer: COMMERCIAL

## 2025-08-25 VITALS
DIASTOLIC BLOOD PRESSURE: 80 MMHG | BODY MASS INDEX: 18.88 KG/M2 | OXYGEN SATURATION: 97 % | HEIGHT: 61 IN | WEIGHT: 100 LBS | HEART RATE: 94 BPM | SYSTOLIC BLOOD PRESSURE: 114 MMHG | TEMPERATURE: 97.2 F

## 2025-08-25 DIAGNOSIS — Z00.00 ANNUAL PHYSICAL EXAM: Primary | ICD-10-CM

## 2025-08-25 DIAGNOSIS — F17.200 TOBACCO USE DISORDER: ICD-10-CM

## 2025-08-25 DIAGNOSIS — M81.0 OSTEOPOROSIS, POST-MENOPAUSAL: ICD-10-CM

## 2025-08-25 DIAGNOSIS — E78.2 MIXED HYPERLIPIDEMIA: ICD-10-CM

## 2025-08-25 DIAGNOSIS — F41.9 ANXIETY: ICD-10-CM

## 2025-08-25 DIAGNOSIS — I10 PRIMARY HYPERTENSION: ICD-10-CM

## 2025-08-25 DIAGNOSIS — J45.20 MILD INTERMITTENT ASTHMA WITHOUT COMPLICATION (HHS-HCC): ICD-10-CM

## 2025-08-25 DIAGNOSIS — E03.9 ACQUIRED HYPOTHYROIDISM: ICD-10-CM

## 2025-08-25 DIAGNOSIS — K64.9 HEMORRHOIDS, UNSPECIFIED HEMORRHOID TYPE: ICD-10-CM

## 2025-08-25 PROCEDURE — 99397 PER PM REEVAL EST PAT 65+ YR: CPT | Performed by: NURSE PRACTITIONER

## 2025-08-25 PROCEDURE — 1160F RVW MEDS BY RX/DR IN RCRD: CPT | Performed by: NURSE PRACTITIONER

## 2025-08-25 PROCEDURE — 3008F BODY MASS INDEX DOCD: CPT | Performed by: NURSE PRACTITIONER

## 2025-08-25 PROCEDURE — 3079F DIAST BP 80-89 MM HG: CPT | Performed by: NURSE PRACTITIONER

## 2025-08-25 PROCEDURE — 1159F MED LIST DOCD IN RCRD: CPT | Performed by: NURSE PRACTITIONER

## 2025-08-25 PROCEDURE — 1126F AMNT PAIN NOTED NONE PRSNT: CPT | Performed by: NURSE PRACTITIONER

## 2025-08-25 PROCEDURE — 3074F SYST BP LT 130 MM HG: CPT | Performed by: NURSE PRACTITIONER

## 2025-08-25 RX ORDER — ASCORBIC ACID 500 MG
500 TABLET ORAL DAILY
COMMUNITY

## 2025-08-25 RX ORDER — ACETAMINOPHEN 500 MG
250 TABLET ORAL DAILY
COMMUNITY

## 2025-08-25 RX ORDER — HYDROCORTISONE 25 MG/G
CREAM TOPICAL 2 TIMES DAILY
Qty: 28 G | Refills: 2 | Status: SHIPPED | OUTPATIENT
Start: 2025-08-25

## 2025-08-25 ASSESSMENT — ENCOUNTER SYMPTOMS
OCCASIONAL FEELINGS OF UNSTEADINESS: 0
CONFUSION: 0
HEADACHES: 0
FATIGUE: 0
NECK PAIN: 0
SHORTNESS OF BREATH: 0
DIZZINESS: 0
FACIAL ASYMMETRY: 0
WOUND: 0
POLYPHAGIA: 0
DYSURIA: 0
BRUISES/BLEEDS EASILY: 0
VOMITING: 0
COUGH: 0
DIAPHORESIS: 0
BLOOD IN STOOL: 0
HEMATURIA: 0
CHILLS: 0
SPEECH DIFFICULTY: 0
LOSS OF SENSATION IN FEET: 0
AGITATION: 0
ADENOPATHY: 0
PALPITATIONS: 0
ABDOMINAL PAIN: 0
FEVER: 0
BACK PAIN: 0
SEIZURES: 0
CHEST TIGHTNESS: 0
DEPRESSION: 0
POLYDIPSIA: 0
NAUSEA: 0
FLANK PAIN: 0

## 2025-08-25 ASSESSMENT — LIFESTYLE VARIABLES
HOW OFTEN DO YOU HAVE A DRINK CONTAINING ALCOHOL: NEVER
AUDIT-C TOTAL SCORE: 0
HOW OFTEN DURING THE LAST YEAR HAVE YOU HAD A FEELING OF GUILT OR REMORSE AFTER DRINKING: NEVER
HOW OFTEN DURING THE LAST YEAR HAVE YOU BEEN UNABLE TO REMEMBER WHAT HAPPENED THE NIGHT BEFORE BECAUSE YOU HAD BEEN DRINKING: NEVER
HOW OFTEN DURING THE LAST YEAR HAVE YOU FAILED TO DO WHAT WAS NORMALLY EXPECTED FROM YOU BECAUSE OF DRINKING: NEVER
HAS A RELATIVE, FRIEND, DOCTOR, OR ANOTHER HEALTH PROFESSIONAL EXPRESSED CONCERN ABOUT YOUR DRINKING OR SUGGESTED YOU CUT DOWN: NO
AUDIT TOTAL SCORE: 0
HAVE YOU OR SOMEONE ELSE BEEN INJURED AS A RESULT OF YOUR DRINKING: NO
HOW OFTEN DURING THE LAST YEAR HAVE YOU NEEDED AN ALCOHOLIC DRINK FIRST THING IN THE MORNING TO GET YOURSELF GOING AFTER A NIGHT OF HEAVY DRINKING: NEVER
HOW OFTEN DURING THE LAST YEAR HAVE YOU FOUND THAT YOU WERE NOT ABLE TO STOP DRINKING ONCE YOU HAD STARTED: NEVER
HOW OFTEN DO YOU HAVE SIX OR MORE DRINKS ON ONE OCCASION: NEVER
SKIP TO QUESTIONS 9-10: 1
HOW MANY STANDARD DRINKS CONTAINING ALCOHOL DO YOU HAVE ON A TYPICAL DAY: PATIENT DOES NOT DRINK

## 2025-08-25 ASSESSMENT — PATIENT HEALTH QUESTIONNAIRE - PHQ9
SUM OF ALL RESPONSES TO PHQ9 QUESTIONS 1 AND 2: 0
2. FEELING DOWN, DEPRESSED OR HOPELESS: NOT AT ALL
1. LITTLE INTEREST OR PLEASURE IN DOING THINGS: NOT AT ALL

## 2025-08-25 ASSESSMENT — PAIN SCALES - GENERAL: PAINLEVEL_OUTOF10: 0-NO PAIN

## 2025-08-29 DIAGNOSIS — E89.2 POSTSURGICAL HYPOPARATHYROIDISM (MULTI): ICD-10-CM

## 2025-08-29 DIAGNOSIS — E06.3 HYPOTHYROIDISM DUE TO HASHIMOTO'S THYROIDITIS: ICD-10-CM

## 2025-08-29 DIAGNOSIS — E83.51 HYPOCALCEMIA: ICD-10-CM

## 2025-08-29 DIAGNOSIS — M81.0 OSTEOPOROSIS, POST-MENOPAUSAL: ICD-10-CM
